# Patient Record
Sex: FEMALE | Race: WHITE | Employment: OTHER | URBAN - METROPOLITAN AREA
[De-identification: names, ages, dates, MRNs, and addresses within clinical notes are randomized per-mention and may not be internally consistent; named-entity substitution may affect disease eponyms.]

---

## 2019-08-02 ENCOUNTER — HOSPITAL ENCOUNTER (INPATIENT)
Age: 84
LOS: 6 days | Discharge: HOME HEALTH | DRG: 178 | End: 2019-08-08
Attending: EMERGENCY MEDICINE | Admitting: INTERNAL MEDICINE
Payer: COMMERCIAL

## 2019-08-02 ENCOUNTER — APPOINTMENT (OUTPATIENT)
Dept: CT IMAGING | Age: 84
DRG: 178 | End: 2019-08-02
Payer: COMMERCIAL

## 2019-08-02 DIAGNOSIS — I48.91 ATRIAL FIBRILLATION WITH RAPID VENTRICULAR RESPONSE (HCC): ICD-10-CM

## 2019-08-02 DIAGNOSIS — Z78.9 FAILURE OF OUTPATIENT TREATMENT: ICD-10-CM

## 2019-08-02 DIAGNOSIS — J44.1 COPD EXACERBATION (HCC): ICD-10-CM

## 2019-08-02 DIAGNOSIS — J18.9 MULTIFOCAL PNEUMONIA: Primary | ICD-10-CM

## 2019-08-02 DIAGNOSIS — Z79.01 ANTICOAGULATED ON COUMADIN: ICD-10-CM

## 2019-08-02 DIAGNOSIS — A41.9 SEPTICEMIA (HCC): ICD-10-CM

## 2019-08-02 LAB
A/G RATIO: 0.8 (ref 1.1–2.2)
ALBUMIN SERPL-MCNC: 3 G/DL (ref 3.4–5)
ALP BLD-CCNC: 137 U/L (ref 40–129)
ALT SERPL-CCNC: 10 U/L (ref 10–40)
ANION GAP SERPL CALCULATED.3IONS-SCNC: 13 MMOL/L (ref 3–16)
AST SERPL-CCNC: 20 U/L (ref 15–37)
BASOPHILS ABSOLUTE: 0.1 K/UL (ref 0–0.2)
BASOPHILS RELATIVE PERCENT: 0.5 %
BILIRUB SERPL-MCNC: 0.7 MG/DL (ref 0–1)
BILIRUBIN URINE: NEGATIVE
BLOOD, URINE: NEGATIVE
BUN BLDV-MCNC: 38 MG/DL (ref 7–20)
CALCIUM SERPL-MCNC: 10 MG/DL (ref 8.3–10.6)
CHLORIDE BLD-SCNC: 104 MMOL/L (ref 99–110)
CLARITY: CLEAR
CO2: 21 MMOL/L (ref 21–32)
COLOR: YELLOW
CREAT SERPL-MCNC: 1.1 MG/DL (ref 0.6–1.2)
EOSINOPHILS ABSOLUTE: 0 K/UL (ref 0–0.6)
EOSINOPHILS RELATIVE PERCENT: 0 %
EPITHELIAL CELLS, UA: 1 /HPF (ref 0–5)
GFR AFRICAN AMERICAN: 57
GFR NON-AFRICAN AMERICAN: 47
GLOBULIN: 3.6 G/DL
GLUCOSE BLD-MCNC: 138 MG/DL (ref 70–99)
GLUCOSE BLD-MCNC: 250 MG/DL (ref 70–99)
GLUCOSE URINE: 250 MG/DL
HCT VFR BLD CALC: 34.2 % (ref 36–48)
HEMOGLOBIN: 10.5 G/DL (ref 12–16)
HYALINE CASTS: 1 /LPF (ref 0–8)
INR BLD: 2.13 (ref 0.86–1.14)
KETONES, URINE: NEGATIVE MG/DL
LACTIC ACID: 1.8 MMOL/L (ref 0.4–2)
LEUKOCYTE ESTERASE, URINE: ABNORMAL
LYMPHOCYTES ABSOLUTE: 0.4 K/UL (ref 1–5.1)
LYMPHOCYTES RELATIVE PERCENT: 2.7 %
MCH RBC QN AUTO: 26.8 PG (ref 26–34)
MCHC RBC AUTO-ENTMCNC: 30.8 G/DL (ref 31–36)
MCV RBC AUTO: 86.9 FL (ref 80–100)
MICROSCOPIC EXAMINATION: YES
MONOCYTES ABSOLUTE: 0.4 K/UL (ref 0–1.3)
MONOCYTES RELATIVE PERCENT: 3.3 %
NEUTROPHILS ABSOLUTE: 12.3 K/UL (ref 1.7–7.7)
NEUTROPHILS RELATIVE PERCENT: 93.5 %
NITRITE, URINE: NEGATIVE
PDW BLD-RTO: 18.5 % (ref 12.4–15.4)
PERFORMED ON: ABNORMAL
PH UA: 5.5 (ref 5–8)
PLATELET # BLD: 173 K/UL (ref 135–450)
PMV BLD AUTO: 9 FL (ref 5–10.5)
POTASSIUM SERPL-SCNC: 4.8 MMOL/L (ref 3.5–5.1)
PRO-BNP: ABNORMAL PG/ML (ref 0–449)
PROCALCITONIN: 2.62 NG/ML (ref 0–0.15)
PROTEIN UA: 30 MG/DL
PROTHROMBIN TIME: 24.3 SEC (ref 9.8–13)
RBC # BLD: 3.93 M/UL (ref 4–5.2)
RBC UA: 3 /HPF (ref 0–4)
SODIUM BLD-SCNC: 138 MMOL/L (ref 136–145)
SPECIFIC GRAVITY UA: 1.02 (ref 1–1.03)
TOTAL PROTEIN: 6.6 G/DL (ref 6.4–8.2)
TROPONIN: <0.01 NG/ML
URINE REFLEX TO CULTURE: YES
URINE TYPE: ABNORMAL
UROBILINOGEN, URINE: 0.2 E.U./DL
WBC # BLD: 13.2 K/UL (ref 4–11)
WBC UA: 2 /HPF (ref 0–5)

## 2019-08-02 PROCEDURE — 87449 NOS EACH ORGANISM AG IA: CPT

## 2019-08-02 PROCEDURE — 6360000002 HC RX W HCPCS: Performed by: PHYSICIAN ASSISTANT

## 2019-08-02 PROCEDURE — 96374 THER/PROPH/DIAG INJ IV PUSH: CPT

## 2019-08-02 PROCEDURE — 83605 ASSAY OF LACTIC ACID: CPT

## 2019-08-02 PROCEDURE — 2580000003 HC RX 258: Performed by: INTERNAL MEDICINE

## 2019-08-02 PROCEDURE — 94640 AIRWAY INHALATION TREATMENT: CPT

## 2019-08-02 PROCEDURE — 36415 COLL VENOUS BLD VENIPUNCTURE: CPT

## 2019-08-02 PROCEDURE — 83880 ASSAY OF NATRIURETIC PEPTIDE: CPT

## 2019-08-02 PROCEDURE — 84145 PROCALCITONIN (PCT): CPT

## 2019-08-02 PROCEDURE — 2580000003 HC RX 258

## 2019-08-02 PROCEDURE — 6360000002 HC RX W HCPCS: Performed by: INTERNAL MEDICINE

## 2019-08-02 PROCEDURE — 87040 BLOOD CULTURE FOR BACTERIA: CPT

## 2019-08-02 PROCEDURE — 2060000000 HC ICU INTERMEDIATE R&B

## 2019-08-02 PROCEDURE — 80053 COMPREHEN METABOLIC PANEL: CPT

## 2019-08-02 PROCEDURE — 87641 MR-STAPH DNA AMP PROBE: CPT

## 2019-08-02 PROCEDURE — 84484 ASSAY OF TROPONIN QUANT: CPT

## 2019-08-02 PROCEDURE — 85025 COMPLETE CBC W/AUTO DIFF WBC: CPT

## 2019-08-02 PROCEDURE — 81001 URINALYSIS AUTO W/SCOPE: CPT

## 2019-08-02 PROCEDURE — 6370000000 HC RX 637 (ALT 250 FOR IP): Performed by: INTERNAL MEDICINE

## 2019-08-02 PROCEDURE — 94760 N-INVAS EAR/PLS OXIMETRY 1: CPT

## 2019-08-02 PROCEDURE — 83036 HEMOGLOBIN GLYCOSYLATED A1C: CPT

## 2019-08-02 PROCEDURE — 93005 ELECTROCARDIOGRAM TRACING: CPT | Performed by: EMERGENCY MEDICINE

## 2019-08-02 PROCEDURE — 71250 CT THORAX DX C-: CPT

## 2019-08-02 PROCEDURE — 85610 PROTHROMBIN TIME: CPT

## 2019-08-02 PROCEDURE — 6370000000 HC RX 637 (ALT 250 FOR IP): Performed by: PHYSICIAN ASSISTANT

## 2019-08-02 PROCEDURE — 6360000002 HC RX W HCPCS: Performed by: EMERGENCY MEDICINE

## 2019-08-02 PROCEDURE — 2580000003 HC RX 258: Performed by: PHYSICIAN ASSISTANT

## 2019-08-02 PROCEDURE — 6360000002 HC RX W HCPCS

## 2019-08-02 PROCEDURE — 87086 URINE CULTURE/COLONY COUNT: CPT

## 2019-08-02 PROCEDURE — 99285 EMERGENCY DEPT VISIT HI MDM: CPT

## 2019-08-02 PROCEDURE — 2700000000 HC OXYGEN THERAPY PER DAY

## 2019-08-02 RX ORDER — BUPROPION HYDROCHLORIDE 100 MG/1
100 TABLET, EXTENDED RELEASE ORAL DAILY
Status: DISCONTINUED | OUTPATIENT
Start: 2019-08-03 | End: 2019-08-08 | Stop reason: HOSPADM

## 2019-08-02 RX ORDER — PRAMIPEXOLE DIHYDROCHLORIDE 0.5 MG/1
0.5 TABLET ORAL 2 TIMES DAILY
COMMUNITY

## 2019-08-02 RX ORDER — OMEPRAZOLE 20 MG/1
20 CAPSULE, DELAYED RELEASE ORAL DAILY
COMMUNITY

## 2019-08-02 RX ORDER — GABAPENTIN 300 MG/1
300 CAPSULE ORAL EVERY EVENING
COMMUNITY

## 2019-08-02 RX ORDER — MONTELUKAST SODIUM 10 MG/1
10 TABLET ORAL NIGHTLY
COMMUNITY

## 2019-08-02 RX ORDER — METHYLPREDNISOLONE SODIUM SUCCINATE 125 MG/2ML
125 INJECTION, POWDER, LYOPHILIZED, FOR SOLUTION INTRAMUSCULAR; INTRAVENOUS ONCE
Status: COMPLETED | OUTPATIENT
Start: 2019-08-02 | End: 2019-08-02

## 2019-08-02 RX ORDER — WARFARIN SODIUM 2 MG/1
2 TABLET ORAL DAILY
Status: DISCONTINUED | OUTPATIENT
Start: 2019-08-02 | End: 2019-08-02 | Stop reason: CLARIF

## 2019-08-02 RX ORDER — 0.9 % SODIUM CHLORIDE 0.9 %
500 INTRAVENOUS SOLUTION INTRAVENOUS ONCE
Status: DISCONTINUED | OUTPATIENT
Start: 2019-08-02 | End: 2019-08-02

## 2019-08-02 RX ORDER — LORAZEPAM 0.5 MG/1
0.5 TABLET ORAL EVERY EVENING
Status: DISCONTINUED | OUTPATIENT
Start: 2019-08-02 | End: 2019-08-08 | Stop reason: HOSPADM

## 2019-08-02 RX ORDER — SPIRONOLACTONE 25 MG/1
25 TABLET ORAL DAILY
COMMUNITY

## 2019-08-02 RX ORDER — SPIRONOLACTONE 25 MG/1
25 TABLET ORAL DAILY
Status: DISCONTINUED | OUTPATIENT
Start: 2019-08-03 | End: 2019-08-08 | Stop reason: HOSPADM

## 2019-08-02 RX ORDER — 0.9 % SODIUM CHLORIDE 0.9 %
30 INTRAVENOUS SOLUTION INTRAVENOUS ONCE
Status: COMPLETED | OUTPATIENT
Start: 2019-08-02 | End: 2019-08-02

## 2019-08-02 RX ORDER — GABAPENTIN 100 MG/1
100 CAPSULE ORAL 3 TIMES DAILY
Status: DISCONTINUED | OUTPATIENT
Start: 2019-08-02 | End: 2019-08-08 | Stop reason: HOSPADM

## 2019-08-02 RX ORDER — IPRATROPIUM BROMIDE AND ALBUTEROL SULFATE 2.5; .5 MG/3ML; MG/3ML
1 SOLUTION RESPIRATORY (INHALATION) ONCE
Status: COMPLETED | OUTPATIENT
Start: 2019-08-02 | End: 2019-08-02

## 2019-08-02 RX ORDER — DEXTROSE MONOHYDRATE 50 MG/ML
100 INJECTION, SOLUTION INTRAVENOUS PRN
Status: DISCONTINUED | OUTPATIENT
Start: 2019-08-02 | End: 2019-08-08 | Stop reason: HOSPADM

## 2019-08-02 RX ORDER — TRAMADOL HYDROCHLORIDE 50 MG/1
50 TABLET ORAL EVERY 6 HOURS PRN
COMMUNITY

## 2019-08-02 RX ORDER — METHYLPREDNISOLONE SODIUM SUCCINATE 40 MG/ML
40 INJECTION, POWDER, LYOPHILIZED, FOR SOLUTION INTRAMUSCULAR; INTRAVENOUS EVERY 8 HOURS
Status: DISCONTINUED | OUTPATIENT
Start: 2019-08-02 | End: 2019-08-05

## 2019-08-02 RX ORDER — CARVEDILOL 3.12 MG/1
3.12 TABLET ORAL 2 TIMES DAILY WITH MEALS
Status: DISCONTINUED | OUTPATIENT
Start: 2019-08-02 | End: 2019-08-08 | Stop reason: HOSPADM

## 2019-08-02 RX ORDER — GABAPENTIN 300 MG/1
300 CAPSULE ORAL EVERY EVENING
Status: DISCONTINUED | OUTPATIENT
Start: 2019-08-02 | End: 2019-08-02 | Stop reason: ALTCHOICE

## 2019-08-02 RX ORDER — CARVEDILOL 3.12 MG/1
3.12 TABLET ORAL 2 TIMES DAILY WITH MEALS
COMMUNITY

## 2019-08-02 RX ORDER — GABAPENTIN 300 MG/1
300 CAPSULE ORAL EVERY EVENING
Status: DISCONTINUED | OUTPATIENT
Start: 2019-08-02 | End: 2019-08-08 | Stop reason: HOSPADM

## 2019-08-02 RX ORDER — ONDANSETRON 2 MG/ML
4 INJECTION INTRAMUSCULAR; INTRAVENOUS EVERY 6 HOURS PRN
Status: DISCONTINUED | OUTPATIENT
Start: 2019-08-02 | End: 2019-08-08 | Stop reason: HOSPADM

## 2019-08-02 RX ORDER — LORAZEPAM 0.5 MG/1
0.5 TABLET ORAL EVERY EVENING
COMMUNITY

## 2019-08-02 RX ORDER — WARFARIN SODIUM 2 MG/1
4 TABLET ORAL EVERY OTHER DAY
Status: DISCONTINUED | OUTPATIENT
Start: 2019-08-02 | End: 2019-08-04 | Stop reason: DRUGHIGH

## 2019-08-02 RX ORDER — SODIUM CHLORIDE 0.9 % (FLUSH) 0.9 %
10 SYRINGE (ML) INJECTION EVERY 12 HOURS SCHEDULED
Status: DISCONTINUED | OUTPATIENT
Start: 2019-08-02 | End: 2019-08-08 | Stop reason: HOSPADM

## 2019-08-02 RX ORDER — BUMETANIDE 1 MG/1
1 TABLET ORAL DAILY
Status: DISCONTINUED | OUTPATIENT
Start: 2019-08-03 | End: 2019-08-08 | Stop reason: HOSPADM

## 2019-08-02 RX ORDER — VANCOMYCIN HYDROCHLORIDE 1 G/200ML
1000 INJECTION, SOLUTION INTRAVENOUS ONCE
Status: DISCONTINUED | OUTPATIENT
Start: 2019-08-02 | End: 2019-08-02 | Stop reason: SDUPTHER

## 2019-08-02 RX ORDER — ASPIRIN 81 MG/1
81 TABLET ORAL DAILY
Status: DISCONTINUED | OUTPATIENT
Start: 2019-08-03 | End: 2019-08-08 | Stop reason: HOSPADM

## 2019-08-02 RX ORDER — GABAPENTIN 100 MG/1
100 CAPSULE ORAL 3 TIMES DAILY
COMMUNITY

## 2019-08-02 RX ORDER — SODIUM CHLORIDE 0.9 % (FLUSH) 0.9 %
10 SYRINGE (ML) INJECTION PRN
Status: DISCONTINUED | OUTPATIENT
Start: 2019-08-02 | End: 2019-08-08 | Stop reason: HOSPADM

## 2019-08-02 RX ORDER — WARFARIN SODIUM 2 MG/1
2 TABLET ORAL
COMMUNITY

## 2019-08-02 RX ORDER — SODIUM CHLORIDE 9 MG/ML
INJECTION, SOLUTION INTRAVENOUS
Status: COMPLETED
Start: 2019-08-02 | End: 2019-08-02

## 2019-08-02 RX ORDER — NICOTINE POLACRILEX 4 MG
15 LOZENGE BUCCAL PRN
Status: DISCONTINUED | OUTPATIENT
Start: 2019-08-02 | End: 2019-08-08 | Stop reason: HOSPADM

## 2019-08-02 RX ORDER — 0.9 % SODIUM CHLORIDE 0.9 %
1000 INTRAVENOUS SOLUTION INTRAVENOUS ONCE
Status: COMPLETED | OUTPATIENT
Start: 2019-08-02 | End: 2019-08-02

## 2019-08-02 RX ORDER — WARFARIN SODIUM 2 MG/1
2 TABLET ORAL EVERY OTHER DAY
Status: DISCONTINUED | OUTPATIENT
Start: 2019-08-03 | End: 2019-08-04 | Stop reason: DRUGHIGH

## 2019-08-02 RX ORDER — LEVOTHYROXINE SODIUM 0.1 MG/1
100 TABLET ORAL DAILY
Status: DISCONTINUED | OUTPATIENT
Start: 2019-08-03 | End: 2019-08-08 | Stop reason: HOSPADM

## 2019-08-02 RX ORDER — DEXTROSE MONOHYDRATE 25 G/50ML
12.5 INJECTION, SOLUTION INTRAVENOUS PRN
Status: DISCONTINUED | OUTPATIENT
Start: 2019-08-02 | End: 2019-08-08 | Stop reason: HOSPADM

## 2019-08-02 RX ORDER — SODIUM CHLORIDE 9 MG/ML
INJECTION, SOLUTION INTRAVENOUS
Status: DISCONTINUED
Start: 2019-08-02 | End: 2019-08-02 | Stop reason: WASHOUT

## 2019-08-02 RX ORDER — BUPROPION HYDROCHLORIDE 100 MG/1
100 TABLET ORAL 2 TIMES DAILY
COMMUNITY

## 2019-08-02 RX ORDER — BUMETANIDE 1 MG/1
1 TABLET ORAL DAILY
COMMUNITY

## 2019-08-02 RX ORDER — ALLOPURINOL 100 MG/1
100 TABLET ORAL DAILY
Status: DISCONTINUED | OUTPATIENT
Start: 2019-08-03 | End: 2019-08-08 | Stop reason: HOSPADM

## 2019-08-02 RX ORDER — INSULIN GLARGINE 100 [IU]/ML
10 INJECTION, SOLUTION SUBCUTANEOUS NIGHTLY
COMMUNITY

## 2019-08-02 RX ORDER — PRAMIPEXOLE DIHYDROCHLORIDE 0.25 MG/1
0.5 TABLET ORAL 2 TIMES DAILY
Status: DISCONTINUED | OUTPATIENT
Start: 2019-08-02 | End: 2019-08-08 | Stop reason: HOSPADM

## 2019-08-02 RX ORDER — LEVOTHYROXINE SODIUM 0.1 MG/1
100 TABLET ORAL DAILY
COMMUNITY

## 2019-08-02 RX ORDER — ALBUTEROL SULFATE 2.5 MG/3ML
5 SOLUTION RESPIRATORY (INHALATION) ONCE
Status: DISCONTINUED | OUTPATIENT
Start: 2019-08-02 | End: 2019-08-02

## 2019-08-02 RX ORDER — GABAPENTIN 100 MG/1
100 CAPSULE ORAL 3 TIMES DAILY
Status: DISCONTINUED | OUTPATIENT
Start: 2019-08-02 | End: 2019-08-02 | Stop reason: CLARIF

## 2019-08-02 RX ORDER — ALLOPURINOL 100 MG/1
100 TABLET ORAL DAILY
COMMUNITY

## 2019-08-02 RX ORDER — ACETAMINOPHEN 325 MG/1
650 TABLET ORAL EVERY 4 HOURS PRN
Status: DISCONTINUED | OUTPATIENT
Start: 2019-08-02 | End: 2019-08-03

## 2019-08-02 RX ORDER — PANTOPRAZOLE SODIUM 40 MG/1
40 TABLET, DELAYED RELEASE ORAL
Status: DISCONTINUED | OUTPATIENT
Start: 2019-08-03 | End: 2019-08-08 | Stop reason: HOSPADM

## 2019-08-02 RX ADMIN — ALBUTEROL SULFATE: 2.5 SOLUTION RESPIRATORY (INHALATION) at 20:06

## 2019-08-02 RX ADMIN — SODIUM CHLORIDE 566 ML: 9 INJECTION, SOLUTION INTRAVENOUS at 17:29

## 2019-08-02 RX ADMIN — METHYLPREDNISOLONE SODIUM SUCCINATE 125 MG: 125 INJECTION, POWDER, FOR SOLUTION INTRAMUSCULAR; INTRAVENOUS at 17:21

## 2019-08-02 RX ADMIN — VANCOMYCIN HYDROCHLORIDE 750 MG: 750 INJECTION, POWDER, LYOPHILIZED, FOR SOLUTION INTRAVENOUS at 23:10

## 2019-08-02 RX ADMIN — PRAMIPEXOLE DIHYDROCHLORIDE 0.5 MG: 0.25 TABLET ORAL at 21:49

## 2019-08-02 RX ADMIN — CARVEDILOL 3.12 MG: 3.12 TABLET, FILM COATED ORAL at 21:49

## 2019-08-02 RX ADMIN — SODIUM CHLORIDE 250 ML: 9 INJECTION, SOLUTION INTRAVENOUS at 22:19

## 2019-08-02 RX ADMIN — LORAZEPAM 0.5 MG: 0.5 TABLET ORAL at 21:48

## 2019-08-02 RX ADMIN — CEFEPIME HYDROCHLORIDE 1 G: 1 INJECTION, POWDER, FOR SOLUTION INTRAMUSCULAR; INTRAVENOUS at 22:18

## 2019-08-02 RX ADMIN — ALBUTEROL SULFATE 5 MG: 2.5 SOLUTION RESPIRATORY (INHALATION) at 16:26

## 2019-08-02 RX ADMIN — SODIUM CHLORIDE 1000 ML: 9 INJECTION, SOLUTION INTRAVENOUS at 17:21

## 2019-08-02 RX ADMIN — INSULIN LISPRO 3 UNITS: 100 INJECTION, SOLUTION INTRAVENOUS; SUBCUTANEOUS at 21:59

## 2019-08-02 RX ADMIN — GABAPENTIN 300 MG: 300 CAPSULE ORAL at 21:49

## 2019-08-02 RX ADMIN — METHYLPREDNISOLONE SODIUM SUCCINATE 40 MG: 40 INJECTION, POWDER, FOR SOLUTION INTRAMUSCULAR; INTRAVENOUS at 21:48

## 2019-08-02 RX ADMIN — Medication 10 ML: at 21:49

## 2019-08-02 RX ADMIN — WARFARIN SODIUM 4 MG: 2 TABLET ORAL at 21:48

## 2019-08-02 RX ADMIN — MEROPENEM 1 G: 1 INJECTION, POWDER, FOR SOLUTION INTRAVENOUS at 18:03

## 2019-08-02 RX ADMIN — IPRATROPIUM BROMIDE AND ALBUTEROL SULFATE 1 AMPULE: .5; 3 SOLUTION RESPIRATORY (INHALATION) at 16:26

## 2019-08-02 SDOH — HEALTH STABILITY: MENTAL HEALTH: HOW OFTEN DO YOU HAVE A DRINK CONTAINING ALCOHOL?: NEVER

## 2019-08-02 ASSESSMENT — ENCOUNTER SYMPTOMS
WHEEZING: 1
SHORTNESS OF BREATH: 1
APNEA: 0
COUGH: 1
VOMITING: 0
NAUSEA: 0
DIARRHEA: 0
ABDOMINAL PAIN: 0
RHINORRHEA: 0
CHEST TIGHTNESS: 1

## 2019-08-02 ASSESSMENT — PAIN SCALES - GENERAL: PAINLEVEL_OUTOF10: 0

## 2019-08-02 NOTE — PROGRESS NOTES
Pt brought to room ia ED stretcher to [phone]. Pt had no belongings with her- does have earrings and glasses on. Pt is visibly tired, RR 22 /77, HR , 98.3 oral, 99% 3L. Pt has no complaints and is alert and oriented- opening eyes to verbal stimulation. Tele box 5390 in place.

## 2019-08-02 NOTE — ED PROVIDER NOTES
activity change, appetite change, chills and fever. HENT: Negative for congestion and rhinorrhea. Respiratory: Positive for cough, chest tightness, shortness of breath and wheezing. Negative for apnea. Cardiovascular: Negative for chest pain. Gastrointestinal: Negative for abdominal pain, diarrhea, nausea and vomiting. Genitourinary: Negative for difficulty urinating, dysuria and hematuria. Positives and Pertinent negatives as per HPI. Except as noted abovein the ROS, all other systems were reviewed and negative. PAST MEDICAL HISTORY   History reviewed. No pertinent past medical history. SURGICAL HISTORY   History reviewed. No pertinent surgical history. CURRENTMEDICATIONS       Previous Medications    ALLOPURINOL (ZYLOPRIM) 100 MG TABLET    Take 100 mg by mouth daily    ASPIRIN 81 MG TABLET    Take 81 mg by mouth daily    BUMETANIDE (BUMEX) 1 MG TABLET    Take 1 mg by mouth daily    BUPROPION (WELLBUTRIN) 100 MG TABLET    Take 100 mg by mouth 2 times daily    CARVEDILOL (COREG) 3.125 MG TABLET    Take 3.125 mg by mouth 2 times daily (with meals)    FERROUS SULFATE (SLOW FE) 142 (45 FE) MG EXTENDED RELEASE TABLET    Take 142 mg by mouth daily    GABAPENTIN (NEURONTIN) 100 MG CAPSULE    Take 100 mg by mouth 3 times daily. GABAPENTIN (NEURONTIN) 300 MG CAPSULE    Take 300 mg by mouth every evening. LEVOTHYROXINE (SYNTHROID) 100 MCG TABLET    Take 100 mcg by mouth Daily    LORAZEPAM (ATIVAN) 0.5 MG TABLET    Take 0.5 mg by mouth every evening. MONTELUKAST (SINGULAIR) 10 MG TABLET    Take 10 mg by mouth nightly    OMEPRAZOLE (PRILOSEC) 20 MG DELAYED RELEASE CAPSULE    Take 20 mg by mouth daily    PRAMIPEXOLE (MIRAPEX) 0.5 MG TABLET    Take 0.5 mg by mouth 2 times daily     SPIRONOLACTONE (ALDACTONE) 25 MG TABLET    Take 25 mg by mouth daily    TRAMADOL (ULTRAM) 50 MG TABLET    Take 50 mg by mouth every 6 hours as needed for Pain.     WARFARIN (COUMADIN) 2 MG TABLET    Take 2 PROTIME-INR - Abnormal; Notable for the following components:    Protime 24.3 (*)     INR 2.13 (*)     All other components within normal limits    Narrative:     Performed at:  [facility] [address],  [facility], [address] [facility]   Phone [phone] PEPTIDE - Abnormal; Notable for the following components:    Pro-BNP 15,358 (*)     All other components within normal limits    Narrative:     Performed at:  [facility] [address],  [facility], [address] [facility]   Phone [phone]   CULTURE BLOOD #2   CULTURE BLOOD #1   TROPONIN    Narrative:     Performed at:  [facility] [address],  [facility], [address] [facility]   Phone [phone]   LACTIC ACID, PLASMA    Narrative:     Performed at:  [facility] [address],  [facility], [address] [facility]   Phone [phone]   URINE RT REFLEX TO CULTURE   PROCALCITONIN       All other labs were within normal range or not returned as of this dictation. EKG: All EKG's are interpreted by the Emergency Department Physician in the absence of a cardiologist.  Please see their note for interpretation of EKG. RADIOLOGY:   Non-plain film images such as CT, Ultrasound and MRI are read by the radiologist. Jarome Crosser radiographic images are visualized andpreliminarily interpreted by the  ED Provider with the below findings:        Interpretation perthe Radiologist below, if available at the time of this note:    CT CHEST [address]   Final Result   Extensive multifocal pneumonia manifested by dense consolidation as well as   discrete and confluent scattered airspace nodularity is an underlying   ground-glass attenuation maximal in the lower lobes and relatively sparing   left upper lobe and lung apices. There is reactive mediastinal   lymphadenopathy.            Ct Chest Wo Contrast    Result Date: 8/2/2019  EXAMINATION: CT OF THE CHEST WITHOUT CONTRAST 8/2/2019 4:38 pm TECHNIQUE: CT of the chest was performed without the administration of intravenous contrast. Multiplanar reformatted images are provided for review. Dose modulation, iterative reconstruction, and/or weight based adjustment of the mA/kV was utilized to reduce the radiation dose to as low as reasonably achievable. COMPARISON: None. HISTORY: ORDERING SYSTEM PROVIDED HISTORY: r/o pneumonia TECHNOLOGIST PROVIDED HISTORY: Reason for Exam: r/o pneumonia Acuity: Unknown Type of Exam: Unknown FINDINGS: Mediastinum: Cardiomegaly. Aortic and coronary artery calcifications noted diffusely. Enlarged pulmonary trunk is noted which can be seen in pulmonary hypertension. There is scattered mediastinal adenopathy. Largest is a precarinal 2.1 x 2.5 cm lymph node. Other nodes are noted in the AP window and there is a subcarinal nodes at least 2 cm in AP diameter. Absence of IV contrast limits separation of the nodes from adjacent structures. Thyroid gland and esophagus grossly. Lungs/pleura: Extensive patchy right lower lobe consolidation with air bronchograms maximal in the posterior basal region. The rest of the right lower lobe as well as the basal segments of the left lower lobe, the inferior lingular and portion of the lateral segment of the middle lobe demonstrate patchy discrete and confluent parenchymal airspace irregular nodular densities with underlying ground-glass attenuation. Combination of findings consistent with multifocal pneumonia. An isolated 2.6 cm airspace parenchymal density in the lingular on series 3, image 52 also felt to be in the spectrum of pneumonia. Multifocal airspace nodularities in the posterior segment right lower lobe also consistent with pneumonia. Trace right pleural effusion. No pneumothorax. Upper Abdomen: No adrenal mass. No suspicious lesions. Severe atherosclerotic calcifications of the splenic artery. Soft Tissues/Bones: Median sternotomy.   No due to a pneumonia. They state that she was sent home on Monday. They state that the patient was doing well on Monday, Tuesday and Wednesday. However starting yesterday she had increasing shortness of breath, and she continues to have a cough which is productive of sputum. No hemoptysis. Did not seem to have worsening shortness of breath, which prompted her visit to the ED. Patient is reporting a chest tightness, she typically gets this with her COPD but she denies any true chest pain. She denies any fever chills. She denies any abdominal pain. No nausea, vomiting or diarrhea. No urinary symptoms. She has no other complaints at this time. Physical exam she is in atrial fibrillation, she is tachycardic, she has diminished aeration and wheezing throughout all lung fields. She is tachypneic    CBC shows a leukocytosis of 13.2, mild anemia. Her CMP is relatively unremarkable. INR is 2.13. Lactic acid is normal.  CT of chest shows extensive multifocal pneumonia with dense consolidation and scattered airspace disease. She was given fluids, vancomycin and meropenem in the ED due to allergies. She is also given multiple breathing treatments and Solu-Medrol. I believe that she will need to be admitted for further care and evaluation, the hospitalist is agreed for admission. Patient and family are updated and agreeable to plan. Stable for admission    SEP-1 CORE MEASURE DATA    Classification: sepsis    Amount of fluids ordered: at least 30mL/kg based on entered actual weight at time of triage    Time at which sepsis was identified: 5:56 PM    Broad-spectrum antibiotics chosen: Vancomycin and Meropenem based on sepsis order-set for a suspected source of: Pulmonary - Nosocomial    Repeat lactate level: not indicated    On reassessment after fluid resuscitation:   pending, to be completed by inpatient team      FINAL IMPRESSION      1. Multifocal pneumonia    2. Failure of outpatient treatment    3. COPD exacerbation ([facility] [address]    4. Septicemia ([facility] [address]    5. Atrial fibrillation with rapid ventricular response ([facility] [address]    6. Anticoagulated on Coumadin          DISPOSITION/PLAN   DISPOSITION Decision To Admit 08/02/2019 05:06:23 PM      PATIENT REFERREDTO:  No follow-up provider specified.     DISCHARGE MEDICATIONS:  New Prescriptions    No medications on file       DISCONTINUED MEDICATIONS:  Discontinued Medications    FERROUS SULFATE (SLOW FE PO)    Take by mouth    IRON DEXTRAN COMPLEX (INFED) 50 MG/ML INJECTION    Inject 100 mg into the muscle once              (Please note that portions ofthis note were completed with a voice recognition program.  Efforts were made to edit the dictations but occasionally words are mis-transcribed.)    Rupa Demarco PA-C (electronically signed)            Rupa Demarco PA-C  08/02/19 3569

## 2019-08-02 NOTE — H&P
Pastor Palma    Radiology:     CXR: I have reviewed the CXR with the following interpretation: Multifocal pneumonia  EKG:  I have reviewed the EKG with the following interpretation: Atrial fibrillation. No signs of acute ischemia    CT CHEST WO CONTRAST   Final Result   Extensive multifocal pneumonia manifested by dense consolidation as well as   discrete and confluent scattered airspace nodularity is an underlying   ground-glass attenuation maximal in the lower lobes and relatively sparing   left upper lobe and lung apices. There is reactive mediastinal   lymphadenopathy. ASSESSMENT:    Active Hospital Problems    Diagnosis Date Noted    Multifocal pneumonia [J18.9] 08/02/2019         PLAN:      Multifocal pneumonia  Recently treated at [facility].  Received 1 dose of IV levofloxacin, later switched to oral doxycycline. At this point, looking at the failure of doxycycline, I believe this is a gram-negative pneumonia. Will be treated with cefepime and vancomycin combination. I am also checking urine Streptococcus and Legionella antigens as well as MRSA nasal swab. Pulmonology consulted. Patient has gotten worse in a short period of time. Already has baseline lung disease. Will need to be followed closely. COPD exacerbation  Started on IV steroids. I am also adding hand-held nebulizers. Pulmonology consulted. Chronic hypoxemic respiratory failure, oxygen dependent at home  Continue oxygen supplementation. Did not need ICU or invasive/noninvasive ventilatory support at the time of the initial visit. Chronic atrial fibrillation  Remains on atrial fibrillation. Heart rate slightly above baseline. No need for intravenous treatment for atrial fibrillation. I expect the heart rate to go down with appropriate treatment of acute infection  Patient is on warfarin, which will be continued. Pharmacy monitors the warfarin dosing.     Chronic kidney disease stage

## 2019-08-03 LAB
A/G RATIO: 0.9 (ref 1.1–2.2)
ALBUMIN SERPL-MCNC: 2.7 G/DL (ref 3.4–5)
ALP BLD-CCNC: 121 U/L (ref 40–129)
ALT SERPL-CCNC: 8 U/L (ref 10–40)
ANION GAP SERPL CALCULATED.3IONS-SCNC: 14 MMOL/L (ref 3–16)
AST SERPL-CCNC: 8 U/L (ref 15–37)
BASOPHILS ABSOLUTE: 0 K/UL (ref 0–0.2)
BASOPHILS RELATIVE PERCENT: 0.2 %
BILIRUB SERPL-MCNC: 0.6 MG/DL (ref 0–1)
BUN BLDV-MCNC: 41 MG/DL (ref 7–20)
CALCIUM SERPL-MCNC: 9.8 MG/DL (ref 8.3–10.6)
CHLORIDE BLD-SCNC: 102 MMOL/L (ref 99–110)
CO2: 20 MMOL/L (ref 21–32)
CREAT SERPL-MCNC: 1.2 MG/DL (ref 0.6–1.2)
EKG ATRIAL RATE: 104 BPM
EKG DIAGNOSIS: NORMAL
EKG Q-T INTERVAL: 286 MS
EKG QRS DURATION: 94 MS
EKG QTC CALCULATION (BAZETT): 394 MS
EKG R AXIS: 29 DEGREES
EKG T AXIS: 135 DEGREES
EKG VENTRICULAR RATE: 114 BPM
EOSINOPHILS ABSOLUTE: 0 K/UL (ref 0–0.6)
EOSINOPHILS RELATIVE PERCENT: 0 %
ESTIMATED AVERAGE GLUCOSE: 177.2 MG/DL
GFR AFRICAN AMERICAN: 51
GFR NON-AFRICAN AMERICAN: 42
GLOBULIN: 3.1 G/DL
GLUCOSE BLD-MCNC: 250 MG/DL (ref 70–99)
GLUCOSE BLD-MCNC: 285 MG/DL (ref 70–99)
GLUCOSE BLD-MCNC: 340 MG/DL (ref 70–99)
GLUCOSE BLD-MCNC: 346 MG/DL (ref 70–99)
GLUCOSE BLD-MCNC: 360 MG/DL (ref 70–99)
HBA1C MFR BLD: 7.8 %
HCT VFR BLD CALC: 29.6 % (ref 36–48)
HEMOGLOBIN: 9.4 G/DL (ref 12–16)
INR BLD: 2.03 (ref 0.86–1.14)
L. PNEUMOPHILA SEROGP 1 UR AG: NORMAL
LYMPHOCYTES ABSOLUTE: 0.3 K/UL (ref 1–5.1)
LYMPHOCYTES RELATIVE PERCENT: 7 %
MCH RBC QN AUTO: 27 PG (ref 26–34)
MCHC RBC AUTO-ENTMCNC: 31.7 G/DL (ref 31–36)
MCV RBC AUTO: 85.2 FL (ref 80–100)
MONOCYTES ABSOLUTE: 0.1 K/UL (ref 0–1.3)
MONOCYTES RELATIVE PERCENT: 2.3 %
NEUTROPHILS ABSOLUTE: 3.6 K/UL (ref 1.7–7.7)
NEUTROPHILS RELATIVE PERCENT: 90.5 %
PDW BLD-RTO: 18.3 % (ref 12.4–15.4)
PERFORMED ON: ABNORMAL
PLATELET # BLD: 131 K/UL (ref 135–450)
PMV BLD AUTO: 9.3 FL (ref 5–10.5)
POTASSIUM REFLEX MAGNESIUM: 4.6 MMOL/L (ref 3.5–5.1)
PROTHROMBIN TIME: 23.1 SEC (ref 9.8–13)
RBC # BLD: 3.47 M/UL (ref 4–5.2)
SODIUM BLD-SCNC: 136 MMOL/L (ref 136–145)
STREP PNEUMONIAE ANTIGEN, URINE: NORMAL
TOTAL PROTEIN: 5.8 G/DL (ref 6.4–8.2)
WBC # BLD: 4 K/UL (ref 4–11)

## 2019-08-03 PROCEDURE — 2580000003 HC RX 258: Performed by: INTERNAL MEDICINE

## 2019-08-03 PROCEDURE — 92526 ORAL FUNCTION THERAPY: CPT

## 2019-08-03 PROCEDURE — 94760 N-INVAS EAR/PLS OXIMETRY 1: CPT

## 2019-08-03 PROCEDURE — 6360000002 HC RX W HCPCS: Performed by: INTERNAL MEDICINE

## 2019-08-03 PROCEDURE — 6370000000 HC RX 637 (ALT 250 FOR IP): Performed by: PHYSICIAN ASSISTANT

## 2019-08-03 PROCEDURE — 92610 EVALUATE SWALLOWING FUNCTION: CPT

## 2019-08-03 PROCEDURE — 93010 ELECTROCARDIOGRAM REPORT: CPT | Performed by: INTERNAL MEDICINE

## 2019-08-03 PROCEDURE — 6370000000 HC RX 637 (ALT 250 FOR IP): Performed by: INTERNAL MEDICINE

## 2019-08-03 PROCEDURE — 2700000000 HC OXYGEN THERAPY PER DAY

## 2019-08-03 PROCEDURE — 87641 MR-STAPH DNA AMP PROBE: CPT

## 2019-08-03 PROCEDURE — 87205 SMEAR GRAM STAIN: CPT

## 2019-08-03 PROCEDURE — 94640 AIRWAY INHALATION TREATMENT: CPT

## 2019-08-03 PROCEDURE — 2060000000 HC ICU INTERMEDIATE R&B

## 2019-08-03 PROCEDURE — 87077 CULTURE AEROBIC IDENTIFY: CPT

## 2019-08-03 PROCEDURE — 36415 COLL VENOUS BLD VENIPUNCTURE: CPT

## 2019-08-03 PROCEDURE — 87186 SC STD MICRODIL/AGAR DIL: CPT

## 2019-08-03 PROCEDURE — 99223 1ST HOSP IP/OBS HIGH 75: CPT | Performed by: INTERNAL MEDICINE

## 2019-08-03 PROCEDURE — 85610 PROTHROMBIN TIME: CPT

## 2019-08-03 PROCEDURE — 94669 MECHANICAL CHEST WALL OSCILL: CPT

## 2019-08-03 PROCEDURE — 87070 CULTURE OTHR SPECIMN AEROBIC: CPT

## 2019-08-03 PROCEDURE — 80053 COMPREHEN METABOLIC PANEL: CPT

## 2019-08-03 PROCEDURE — 85025 COMPLETE CBC W/AUTO DIFF WBC: CPT

## 2019-08-03 PROCEDURE — 94761 N-INVAS EAR/PLS OXIMETRY MLT: CPT

## 2019-08-03 RX ORDER — IPRATROPIUM BROMIDE AND ALBUTEROL SULFATE 2.5; .5 MG/3ML; MG/3ML
1 SOLUTION RESPIRATORY (INHALATION) ONCE
Status: COMPLETED | OUTPATIENT
Start: 2019-08-03 | End: 2019-08-03

## 2019-08-03 RX ORDER — ALBUTEROL SULFATE 2.5 MG/3ML
2.5 SOLUTION RESPIRATORY (INHALATION) EVERY 4 HOURS PRN
Status: DISCONTINUED | OUTPATIENT
Start: 2019-08-03 | End: 2019-08-08 | Stop reason: HOSPADM

## 2019-08-03 RX ORDER — KETOROLAC TROMETHAMINE 30 MG/ML
15 INJECTION, SOLUTION INTRAMUSCULAR; INTRAVENOUS EVERY 6 HOURS PRN
Status: DISCONTINUED | OUTPATIENT
Start: 2019-08-03 | End: 2019-08-08 | Stop reason: HOSPADM

## 2019-08-03 RX ORDER — BENZONATATE 100 MG/1
100 CAPSULE ORAL 3 TIMES DAILY PRN
Status: DISCONTINUED | OUTPATIENT
Start: 2019-08-03 | End: 2019-08-08 | Stop reason: HOSPADM

## 2019-08-03 RX ORDER — ACETAMINOPHEN 500 MG
1000 TABLET ORAL EVERY 6 HOURS PRN
Status: DISCONTINUED | OUTPATIENT
Start: 2019-08-03 | End: 2019-08-08 | Stop reason: HOSPADM

## 2019-08-03 RX ORDER — IPRATROPIUM BROMIDE AND ALBUTEROL SULFATE 2.5; .5 MG/3ML; MG/3ML
1 SOLUTION RESPIRATORY (INHALATION)
Status: DISCONTINUED | OUTPATIENT
Start: 2019-08-03 | End: 2019-08-08 | Stop reason: HOSPADM

## 2019-08-03 RX ADMIN — METHYLPREDNISOLONE SODIUM SUCCINATE 40 MG: 40 INJECTION, POWDER, FOR SOLUTION INTRAMUSCULAR; INTRAVENOUS at 05:19

## 2019-08-03 RX ADMIN — INSULIN LISPRO 8 UNITS: 100 INJECTION, SOLUTION INTRAVENOUS; SUBCUTANEOUS at 18:18

## 2019-08-03 RX ADMIN — INSULIN LISPRO 10 UNITS: 100 INJECTION, SOLUTION INTRAVENOUS; SUBCUTANEOUS at 12:21

## 2019-08-03 RX ADMIN — GABAPENTIN 100 MG: 100 CAPSULE ORAL at 09:03

## 2019-08-03 RX ADMIN — WARFARIN SODIUM 2 MG: 2 TABLET ORAL at 18:21

## 2019-08-03 RX ADMIN — CEFEPIME HYDROCHLORIDE 1 G: 1 INJECTION, POWDER, FOR SOLUTION INTRAMUSCULAR; INTRAVENOUS at 09:04

## 2019-08-03 RX ADMIN — METHYLPREDNISOLONE SODIUM SUCCINATE 40 MG: 40 INJECTION, POWDER, FOR SOLUTION INTRAMUSCULAR; INTRAVENOUS at 13:21

## 2019-08-03 RX ADMIN — INSULIN GLARGINE 10 UNITS: 100 INJECTION, SOLUTION SUBCUTANEOUS at 22:07

## 2019-08-03 RX ADMIN — LEVOTHYROXINE SODIUM 100 MCG: 100 TABLET ORAL at 05:19

## 2019-08-03 RX ADMIN — Medication 10 ML: at 22:14

## 2019-08-03 RX ADMIN — PRAMIPEXOLE DIHYDROCHLORIDE 0.5 MG: 0.25 TABLET ORAL at 22:14

## 2019-08-03 RX ADMIN — IPRATROPIUM BROMIDE AND ALBUTEROL SULFATE 1 AMPULE: .5; 3 SOLUTION RESPIRATORY (INHALATION) at 15:41

## 2019-08-03 RX ADMIN — GABAPENTIN 100 MG: 100 CAPSULE ORAL at 14:44

## 2019-08-03 RX ADMIN — INSULIN LISPRO 6 UNITS: 100 INJECTION, SOLUTION INTRAVENOUS; SUBCUTANEOUS at 09:01

## 2019-08-03 RX ADMIN — ALLOPURINOL 100 MG: 100 TABLET ORAL at 09:02

## 2019-08-03 RX ADMIN — GABAPENTIN 100 MG: 100 CAPSULE ORAL at 18:21

## 2019-08-03 RX ADMIN — CARVEDILOL 3.12 MG: 3.12 TABLET, FILM COATED ORAL at 09:03

## 2019-08-03 RX ADMIN — Medication 10 ML: at 13:21

## 2019-08-03 RX ADMIN — Medication 10 ML: at 09:04

## 2019-08-03 RX ADMIN — PANTOPRAZOLE SODIUM 40 MG: 40 TABLET, DELAYED RELEASE ORAL at 05:19

## 2019-08-03 RX ADMIN — VANCOMYCIN HYDROCHLORIDE 750 MG: 750 INJECTION, POWDER, LYOPHILIZED, FOR SOLUTION INTRAVENOUS at 22:20

## 2019-08-03 RX ADMIN — BENZONATATE 100 MG: 100 CAPSULE ORAL at 16:33

## 2019-08-03 RX ADMIN — LORAZEPAM 0.5 MG: 0.5 TABLET ORAL at 22:14

## 2019-08-03 RX ADMIN — INSULIN LISPRO 4 UNITS: 100 INJECTION, SOLUTION INTRAVENOUS; SUBCUTANEOUS at 22:07

## 2019-08-03 RX ADMIN — CEFEPIME HYDROCHLORIDE 1 G: 1 INJECTION, POWDER, FOR SOLUTION INTRAMUSCULAR; INTRAVENOUS at 20:36

## 2019-08-03 RX ADMIN — SPIRONOLACTONE 25 MG: 25 TABLET ORAL at 09:03

## 2019-08-03 RX ADMIN — IPRATROPIUM BROMIDE AND ALBUTEROL SULFATE 1 AMPULE: .5; 3 SOLUTION RESPIRATORY (INHALATION) at 12:04

## 2019-08-03 RX ADMIN — METHYLPREDNISOLONE SODIUM SUCCINATE 40 MG: 40 INJECTION, POWDER, FOR SOLUTION INTRAMUSCULAR; INTRAVENOUS at 18:22

## 2019-08-03 RX ADMIN — GABAPENTIN 300 MG: 300 CAPSULE ORAL at 22:14

## 2019-08-03 RX ADMIN — BUMETANIDE 1 MG: 1 TABLET ORAL at 12:20

## 2019-08-03 RX ADMIN — PRAMIPEXOLE DIHYDROCHLORIDE 0.5 MG: 0.25 TABLET ORAL at 09:03

## 2019-08-03 RX ADMIN — IPRATROPIUM BROMIDE AND ALBUTEROL SULFATE 1 AMPULE: .5; 3 SOLUTION RESPIRATORY (INHALATION) at 20:49

## 2019-08-03 RX ADMIN — ASPIRIN 81 MG: 81 TABLET ORAL at 09:03

## 2019-08-03 RX ADMIN — Medication 10 ML: at 18:20

## 2019-08-03 RX ADMIN — BUPROPION HYDROCHLORIDE 100 MG: 100 TABLET, EXTENDED RELEASE ORAL at 09:03

## 2019-08-03 RX ADMIN — CARVEDILOL 3.12 MG: 3.12 TABLET, FILM COATED ORAL at 18:20

## 2019-08-03 ASSESSMENT — PAIN SCALES - GENERAL
PAINLEVEL_OUTOF10: 0

## 2019-08-03 NOTE — PROGRESS NOTES
appears stated age and cooperative. HEENT: Pupils equal, round, and reactive to light. Conjunctivae/corneas clear. Neck: Supple, with full range of motion. No jugular venous distention. Trachea midline. Respiratory:  Normal respiratory effort. Lateral wheezes and crackles  Cardiovascular: Regular rate and rhythm with normal S1/S2 without murmurs, rubs or gallops. Abdomen: Soft, non-tender, non-distended with normal bowel sounds. Musculoskeletal: Has upper extremity clubbing, no cyanosis or edema bilaterally. Full range of motion without deformity. Skin: Skin color, texture, turgor normal.  No rashes or lesions. Neurologic:  Neurovascularly intact without any focal sensory/motor deficits.  Cranial nerves: II-XII intact, grossly non-focal.  Psychiatric: Alert and oriented, thought content appropriate, normal insight  Capillary Refill: Brisk,< 3 seconds   Peripheral Pulses: +2 palpable, equal bilaterally       Labs:   Recent Labs     08/02/19 1618 08/03/19  0548   WBC 13.2* 4.0   HGB 10.5* 9.4*   HCT 34.2* 29.6*    131*     Recent Labs     08/02/19  1618 08/03/19  0548    136   K 4.8 4.6    102   CO2 21 20*   BUN 38* 41*   CREATININE 1.1 1.2   CALCIUM 10.0 9.8     Recent Labs     08/02/19  1618 08/03/19  0548   AST 20 8*   ALT 10 8*   BILITOT 0.7 0.6   ALKPHOS 137* 121     Recent Labs     08/02/19  1618 08/03/19  0548   INR 2.13* 2.03*     Recent Labs     08/02/19 1618   TROPONINI <0.01       Urinalysis:      Lab Results   Component Value Date    NITRU Negative 08/02/2019    WBCUA 2 08/02/2019    RBCUA 3 08/02/2019    BLOODU Negative 08/02/2019    SPECGRAV 1.018 08/02/2019    GLUCOSEU 250 08/02/2019       Radiology:  CT CHEST WO CONTRAST   Final Result   Extensive multifocal pneumonia manifested by dense consolidation as well as   discrete and confluent scattered airspace nodularity is an underlying   ground-glass attenuation maximal in the lower lobes and relatively sparing   left upper

## 2019-08-03 NOTE — PROGRESS NOTES
CLINICAL BEDSIDE SWALLOWING EVALUATION  Speech Therapy Department    Patient Name:  Jesika Cancino  :  1930  Pain level:denies   Medical Diagnosis:   Multifocal pneumonia [J18.9]  Multifocal pneumonia [J18.9]    HPI: \"89 y.o. female who presented to [facility] with aggressive cough and shortness of breath for past 24 hours. Patient was admitted at [facility] last week. At that time, she was admitted with septic shock. Treated with IV fluids and pressors. Quickly switched to orals and discharged home on oral doxycycline. Did well in first couple days, but today she started having progressively worsening shortness of breath and cough. Came to the emergency room because of inability to function. Appears in acute distress in the emergency room. However, she has been doing fine with nasal cannula oxygen only. Evaluation showed patient had multifocal pneumonia. Patient is being admitted. Hospitalist group called for admission  at bedside. \"  CT chest:  Impression   Extensive multifocal pneumonia manifested by dense consolidation as well as   discrete and confluent scattered airspace nodularity is an underlying   ground-glass attenuation maximal in the lower lobes and relatively sparing   left upper lobe and lung apices.  There is reactive mediastinal   lymphadenopathy. SLP eval and treat orders received due to concern for aspiration. Per chart, pt with Modified Barium Swallow completed at outside hospital in 2018. Prior MBS revealed mild oropharyngeal dysphagia with no aspiration/penetration and recommendations for Regular texture diet with thin liquids with use of strict aspiration precautions. Pt reported she avoids dry and stringy foods due feeling as if they get stuck. She also states she has to eat/drink slowly. Treatment Diagnosis: Moderate Oropharyngeal Dysphagia    Impressions: Pt was positioned upright in bed on 2L O2 via nasal cannula.  Congested cough was noted

## 2019-08-03 NOTE — PROGRESS NOTES
4 Eyes Skin Assessment     The patient is being assess for  Admission    I agree that 2 RN's have performed a thorough Head to Toe Skin Assessment on the patient. ALL assessment sites listed below have been assessed. Areas assessed by both nurses: Yes  [x]   Head, Face, and Ears   [x]   Shoulders, Back, and Chest  [x]   Arms, Elbows, and Hands   [x]   Coccyx, Sacrum, and IschIum  [x]   Legs, Feet, and Heels        Does the Patient have Skin Breakdown?   Yes, healing stage II wound, mepilex in place         Denys Prevention initiated:  Yes   Wound Care Orders initiated:  No      WOC nurse consulted for Pressure Injury (Stage 3,4, Unstageable, DTI, NWPT, and Complex wounds), New and Established Ostomies:  No      Nurse 1 eSignature: Electronically signed by Gaines Favre, RN on 8/3/19 at 5:15 AM    **SHARE this note so that the co-signing nurse is able to place an eSignature**    Nurse 2 eSignature: Electronically signed by Mariah Shoemaker RN on 8/3/19 at 8:11 PM

## 2019-08-04 LAB
GLUCOSE BLD-MCNC: 269 MG/DL (ref 70–99)
GLUCOSE BLD-MCNC: 327 MG/DL (ref 70–99)
GLUCOSE BLD-MCNC: 335 MG/DL (ref 70–99)
GLUCOSE BLD-MCNC: 362 MG/DL (ref 70–99)
INR BLD: 2.94 (ref 0.86–1.14)
MRSA SCREEN RT-PCR: NORMAL
PERFORMED ON: ABNORMAL
PROTHROMBIN TIME: 33.5 SEC (ref 9.8–13)
URINE CULTURE, ROUTINE: NORMAL

## 2019-08-04 PROCEDURE — 94669 MECHANICAL CHEST WALL OSCILL: CPT

## 2019-08-04 PROCEDURE — 6360000002 HC RX W HCPCS: Performed by: PHYSICIAN ASSISTANT

## 2019-08-04 PROCEDURE — 97161 PT EVAL LOW COMPLEX 20 MIN: CPT

## 2019-08-04 PROCEDURE — 2700000000 HC OXYGEN THERAPY PER DAY

## 2019-08-04 PROCEDURE — 97530 THERAPEUTIC ACTIVITIES: CPT

## 2019-08-04 PROCEDURE — 94640 AIRWAY INHALATION TREATMENT: CPT

## 2019-08-04 PROCEDURE — 2060000000 HC ICU INTERMEDIATE R&B

## 2019-08-04 PROCEDURE — 97165 OT EVAL LOW COMPLEX 30 MIN: CPT

## 2019-08-04 PROCEDURE — 85610 PROTHROMBIN TIME: CPT

## 2019-08-04 PROCEDURE — 97535 SELF CARE MNGMENT TRAINING: CPT

## 2019-08-04 PROCEDURE — 6370000000 HC RX 637 (ALT 250 FOR IP): Performed by: INTERNAL MEDICINE

## 2019-08-04 PROCEDURE — 6370000000 HC RX 637 (ALT 250 FOR IP): Performed by: PHYSICIAN ASSISTANT

## 2019-08-04 PROCEDURE — 6360000002 HC RX W HCPCS: Performed by: INTERNAL MEDICINE

## 2019-08-04 PROCEDURE — 92526 ORAL FUNCTION THERAPY: CPT

## 2019-08-04 PROCEDURE — 2580000003 HC RX 258

## 2019-08-04 PROCEDURE — 99233 SBSQ HOSP IP/OBS HIGH 50: CPT | Performed by: INTERNAL MEDICINE

## 2019-08-04 PROCEDURE — 36415 COLL VENOUS BLD VENIPUNCTURE: CPT

## 2019-08-04 PROCEDURE — 94761 N-INVAS EAR/PLS OXIMETRY MLT: CPT

## 2019-08-04 PROCEDURE — 2580000003 HC RX 258: Performed by: INTERNAL MEDICINE

## 2019-08-04 RX ORDER — SODIUM CHLORIDE 9 MG/ML
INJECTION, SOLUTION INTRAVENOUS
Status: COMPLETED
Start: 2019-08-04 | End: 2019-08-04

## 2019-08-04 RX ADMIN — INSULIN GLARGINE 10 UNITS: 100 INJECTION, SOLUTION SUBCUTANEOUS at 20:21

## 2019-08-04 RX ADMIN — PRAMIPEXOLE DIHYDROCHLORIDE 0.5 MG: 0.25 TABLET ORAL at 20:20

## 2019-08-04 RX ADMIN — SODIUM CHLORIDE 50 ML: 9 INJECTION, SOLUTION INTRAVENOUS at 08:11

## 2019-08-04 RX ADMIN — ALLOPURINOL 100 MG: 100 TABLET ORAL at 08:02

## 2019-08-04 RX ADMIN — ASPIRIN 81 MG: 81 TABLET ORAL at 08:02

## 2019-08-04 RX ADMIN — LEVOTHYROXINE SODIUM 100 MCG: 100 TABLET ORAL at 05:22

## 2019-08-04 RX ADMIN — IPRATROPIUM BROMIDE AND ALBUTEROL SULFATE 1 AMPULE: .5; 3 SOLUTION RESPIRATORY (INHALATION) at 11:43

## 2019-08-04 RX ADMIN — INSULIN LISPRO 8 UNITS: 100 INJECTION, SOLUTION INTRAVENOUS; SUBCUTANEOUS at 09:01

## 2019-08-04 RX ADMIN — CEFEPIME HYDROCHLORIDE 1 G: 1 INJECTION, POWDER, FOR SOLUTION INTRAMUSCULAR; INTRAVENOUS at 20:21

## 2019-08-04 RX ADMIN — INSULIN LISPRO 6 UNITS: 100 INJECTION, SOLUTION INTRAVENOUS; SUBCUTANEOUS at 17:10

## 2019-08-04 RX ADMIN — INSULIN LISPRO 10 UNITS: 100 INJECTION, SOLUTION INTRAVENOUS; SUBCUTANEOUS at 11:51

## 2019-08-04 RX ADMIN — ACETAMINOPHEN 1000 MG: 500 TABLET, FILM COATED ORAL at 00:15

## 2019-08-04 RX ADMIN — Medication 10 ML: at 11:51

## 2019-08-04 RX ADMIN — GABAPENTIN 100 MG: 100 CAPSULE ORAL at 08:02

## 2019-08-04 RX ADMIN — CARVEDILOL 3.12 MG: 3.12 TABLET, FILM COATED ORAL at 17:09

## 2019-08-04 RX ADMIN — SPIRONOLACTONE 25 MG: 25 TABLET ORAL at 08:02

## 2019-08-04 RX ADMIN — GABAPENTIN 100 MG: 100 CAPSULE ORAL at 17:09

## 2019-08-04 RX ADMIN — KETOROLAC TROMETHAMINE 15 MG: 30 INJECTION, SOLUTION INTRAMUSCULAR at 08:10

## 2019-08-04 RX ADMIN — BENZONATATE 100 MG: 100 CAPSULE ORAL at 08:10

## 2019-08-04 RX ADMIN — METHYLPREDNISOLONE SODIUM SUCCINATE 40 MG: 40 INJECTION, POWDER, FOR SOLUTION INTRAMUSCULAR; INTRAVENOUS at 11:50

## 2019-08-04 RX ADMIN — BUMETANIDE 1 MG: 1 TABLET ORAL at 09:18

## 2019-08-04 RX ADMIN — IPRATROPIUM BROMIDE AND ALBUTEROL SULFATE 1 AMPULE: .5; 3 SOLUTION RESPIRATORY (INHALATION) at 07:34

## 2019-08-04 RX ADMIN — PANTOPRAZOLE SODIUM 40 MG: 40 TABLET, DELAYED RELEASE ORAL at 05:22

## 2019-08-04 RX ADMIN — CEFEPIME HYDROCHLORIDE 1 G: 1 INJECTION, POWDER, FOR SOLUTION INTRAMUSCULAR; INTRAVENOUS at 08:06

## 2019-08-04 RX ADMIN — Medication 10 ML: at 08:03

## 2019-08-04 RX ADMIN — GABAPENTIN 300 MG: 300 CAPSULE ORAL at 20:20

## 2019-08-04 RX ADMIN — Medication 10 ML: at 23:13

## 2019-08-04 RX ADMIN — PRAMIPEXOLE DIHYDROCHLORIDE 0.5 MG: 0.25 TABLET ORAL at 08:02

## 2019-08-04 RX ADMIN — ACETAMINOPHEN 1000 MG: 500 TABLET, FILM COATED ORAL at 05:22

## 2019-08-04 RX ADMIN — IPRATROPIUM BROMIDE AND ALBUTEROL SULFATE 1 AMPULE: .5; 3 SOLUTION RESPIRATORY (INHALATION) at 16:18

## 2019-08-04 RX ADMIN — IPRATROPIUM BROMIDE AND ALBUTEROL SULFATE 1 AMPULE: .5; 3 SOLUTION RESPIRATORY (INHALATION) at 20:34

## 2019-08-04 RX ADMIN — GABAPENTIN 100 MG: 100 CAPSULE ORAL at 11:50

## 2019-08-04 RX ADMIN — METHYLPREDNISOLONE SODIUM SUCCINATE 40 MG: 40 INJECTION, POWDER, FOR SOLUTION INTRAMUSCULAR; INTRAVENOUS at 02:52

## 2019-08-04 RX ADMIN — METHYLPREDNISOLONE SODIUM SUCCINATE 40 MG: 40 INJECTION, POWDER, FOR SOLUTION INTRAMUSCULAR; INTRAVENOUS at 20:20

## 2019-08-04 RX ADMIN — CARVEDILOL 3.12 MG: 3.12 TABLET, FILM COATED ORAL at 08:02

## 2019-08-04 RX ADMIN — BUPROPION HYDROCHLORIDE 100 MG: 100 TABLET, EXTENDED RELEASE ORAL at 08:06

## 2019-08-04 RX ADMIN — INSULIN LISPRO 4 UNITS: 100 INJECTION, SOLUTION INTRAVENOUS; SUBCUTANEOUS at 20:22

## 2019-08-04 RX ADMIN — KETOROLAC TROMETHAMINE 15 MG: 30 INJECTION, SOLUTION INTRAMUSCULAR at 00:17

## 2019-08-04 ASSESSMENT — PAIN SCALES - GENERAL
PAINLEVEL_OUTOF10: 7
PAINLEVEL_OUTOF10: 7
PAINLEVEL_OUTOF10: 5
PAINLEVEL_OUTOF10: 8
PAINLEVEL_OUTOF10: 7

## 2019-08-04 ASSESSMENT — PAIN DESCRIPTION - DESCRIPTORS
DESCRIPTORS: ACHING;SORE

## 2019-08-04 ASSESSMENT — PAIN DESCRIPTION - PAIN TYPE
TYPE: ACUTE PAIN
TYPE: CHRONIC PAIN
TYPE: ACUTE PAIN

## 2019-08-04 ASSESSMENT — PAIN DESCRIPTION - PROGRESSION
CLINICAL_PROGRESSION: GRADUALLY WORSENING
CLINICAL_PROGRESSION: GRADUALLY WORSENING
CLINICAL_PROGRESSION: NOT CHANGED

## 2019-08-04 ASSESSMENT — PAIN DESCRIPTION - ONSET
ONSET: ON-GOING

## 2019-08-04 ASSESSMENT — PAIN DESCRIPTION - FREQUENCY
FREQUENCY: CONTINUOUS

## 2019-08-04 ASSESSMENT — PAIN DESCRIPTION - LOCATION
LOCATION: TOE (COMMENT WHICH ONE)
LOCATION: FOOT
LOCATION: BACK;FOOT;CHEST

## 2019-08-04 NOTE — PROGRESS NOTES
Speech Language Pathology  [facility]   Speech Therapy  Daily Dysphagia Treatment Note        Dom Solders  AGE: 80 y.o. GENDER: female  : 1930  [id number]  EPISODE DATE:  2019  Patient Active Problem List   Diagnosis    Multifocal pneumonia     Allergies   Allergen Reactions    Latex      rash    Ace Inhibitors     Advair Diskus [Fluticasone-Salmeterol]      \"white ring around mouth\"    Byetta 10 Mcg Pen [Exenatide]     Ciprofloxacin      hives    Codeine      Oral swelling      Contrast [Barium-Containing Compounds]      hives    Demerol Hcl [Meperidine]      seizure    Glucophage [Metformin Hcl]     Levaquin [Levofloxacin In D5w]      hives    Morphine      Oral swelling    Niacin And Related      hives    Penicillins      hives    Rocephin [Ceftriaxone]      phlebitis    Statins      Muscle cramps    Zetia [Ezetimibe]      Muscle pain     Treatment Diagnosis: Dysphagia     Chart review:   · HPI: \"89 y.o. female who presented to [facility] aggressive cough and shortness of breath for past 24 hours. Patient was admitted at [facility] last week.  At that time, she was admitted with septic shock.  Treated with IV fluids and pressors.  Quickly switched to orals and discharged home on oral doxycycline.  Did well in first couple days, but today she started having progressively worsening shortness of breath and cough.  Came to the emergency room because of inability to function.  Appears in acute distress in the emergency room. [facility], she has been doing fine with nasal cannula oxygen only. Evaluation showed patient had multifocal pneumonia.  Patient is being admitted. [address] group called for admission  at bedside. \"  · CT chest: 2019  Impression   Extensive multifocal pneumonia manifested by dense consolidation as well as   discrete and confluent scattered airspace nodularity is an underlying   ground-glass attenuation

## 2019-08-04 NOTE — PROGRESS NOTES
[facility] Pulmonary and Critical Care  Progress note      Reason for Consult: Multifocal pneumonia, respiratory failure  Requesting Physician: Dr. Veronica Rivas  Subjective:   [address] [facility]:                The patient is a 80 y.o. female with significant past medical history of:  History reviewed. No pertinent past medical history. The patient looks significantly better this morning. Speech is at the bedside. They are suggesting a modified barium swallow. Past Surgical History:    History reviewed. No pertinent surgical history.   Current Medications:    Current Facility-Administered Medications: ipratropium-albuterol (DUONEB) nebulizer solution 1 ampule, 1 ampule, Inhalation, Q4H WA  albuterol (PROVENTIL) nebulizer solution 2.5 mg, 2.5 mg, Nebulization, Q4H PRN  benzonatate (TESSALON) capsule 100 mg, 100 mg, Oral, TID PRN  insulin glargine (LANTUS) injection pen 10 Units, 10 Units, Subcutaneous, Nightly  acetaminophen (TYLENOL) tablet 1,000 mg, 1,000 mg, Oral, Q6H PRN  ketorolac (TORADOL) injection 15 mg, 15 mg, Intravenous, Q6H PRN  allopurinol (ZYLOPRIM) tablet 100 mg, 100 mg, Oral, Daily  aspirin EC tablet 81 mg, 81 mg, Oral, Daily  bumetanide (BUMEX) tablet 1 mg, 1 mg, Oral, Daily  buPROPion (WELLBUTRIN SR) extended release tablet 100 mg, 100 mg, Oral, Daily  carvedilol (COREG) tablet 3.125 mg, 3.125 mg, Oral, BID WC  gabapentin (NEURONTIN) capsule 100 mg, 100 mg, Oral, TID  levothyroxine (SYNTHROID) tablet 100 mcg, 100 mcg, Oral, Daily  LORazepam (ATIVAN) tablet 0.5 mg, 0.5 mg, Oral, QPM  pantoprazole (PROTONIX) tablet 40 mg, 40 mg, Oral, QAM AC  pramipexole (MIRAPEX) tablet 0.5 mg, 0.5 mg, Oral, BID  spironolactone (ALDACTONE) tablet 25 mg, 25 mg, Oral, Daily  cefepime (MAXIPIME) 1 g IVPB minibag, 1 g, Intravenous, Q12H  sodium chloride flush 0.9 % injection 10 mL, 10 mL, Intravenous, 2 times per day  sodium chloride flush 0.9 % injection 10 mL, 10 mL, Intravenous, PRN  magnesium hydroxide (MILK OF CONSTITUTIONAL:  negative for fevers, chills, diaphoresis, activity change, appetite change, fatigue, night sweats and unexpected weight change. EYES:  negative for blurred vision, eye discharge, visual disturbance and icterus  HEENT:  negative for hearing loss, tinnitus, ear drainage, sinus pressure, nasal congestion, epistaxis and snoring  RESPIRATORY:  See HPI  CARDIOVASCULAR:  negative for chest pain, palpitations, exertional chest pressure/discomfort, edema, syncope  GASTROINTESTINAL:  negative for nausea, vomiting, diarrhea, constipation, blood in stool and abdominal pain  GENITOURINARY:  negative for frequency, dysuria, urinary incontinence, decreased urine volume, and hematuria  HEMATOLOGIC/LYMPHATIC:  negative for easy bruising, bleeding and lymphadenopathy  ALLERGIC/IMMUNOLOGIC:  negative for recurrent infections, angioedema, anaphylaxis and drug reactions  ENDOCRINE:  negative for weight changes and diabetic symptoms including polyuria, polydipsia and polyphagia  MUSCULOSKELETAL:  negative for  pain, joint swelling, decreased range of motion and muscle weakness  NEUROLOGICAL:  negative for headaches, slurred speech, unilateral weakness  PSYCHIATRIC/BEHAVIORAL: negative for hallucinations, behavioral problems, confusion and agitation.      Objective:   PHYSICAL EXAM:      VITALS:  BP (!) 160/89   Pulse 75   Temp 98 °F (36.7 °C) (Oral)   Resp 18   Ht 5' 4\" (1.626 m)   Wt 138 lb 12.8 oz (63 kg)   SpO2 95%   BMI 23.82 kg/m²      24HR INTAKE/OUTPUT:      Intake/Output Summary (Last 24 hours) at 8/4/2019 1127  Last data filed at 8/4/2019 0515  Gross per 24 hour   Intake 720 ml   Output 1100 ml   Net -380 ml     CONSTITUTIONAL:  awake, alert, cooperative, no apparent distress, and appears stated age  NECK:  Supple, symmetrical, trachea midline, no adenopathy, thyroid symmetric, not enlarged and no tenderness, skin normal  LUNGS:  no increased work of breathing and diffuse wheezing and rhonchi to

## 2019-08-04 NOTE — PLAN OF CARE
Problem: Falls - Risk of:  Goal: Will remain free from falls  Description  Will remain free from falls  8/3/2019 2332 by Jovan Cunha RN  Outcome: Ongoing  8/3/2019 2023 by Vaughn Boas, RN  Outcome: Ongoing  8/3/2019 2017 by Vaughn Boas, RN  Outcome: Ongoing  8/3/2019 2012 by Vaughn Boas, RN  Outcome: Ongoing  Goal: Absence of physical injury  Description  Absence of physical injury  8/3/2019 2332 by Jovan Cunha RN  Outcome: Ongoing  8/3/2019 2023 by Vaughn Boas, RN  Outcome: Ongoing  8/3/2019 2017 by Vaughn Boas, RN  Outcome: Ongoing  8/3/2019 2012 by Vaughn Boas, RN  Outcome: Ongoing     Problem: Discharge Planning:  Goal: Discharged to appropriate level of care  Description  Discharged to appropriate level of care  8/3/2019 2332 by Jovan Cunha RN  Outcome: Ongoing  8/3/2019 2023 by Vaughn Boas, RN  Outcome: Ongoing  8/3/2019 2017 by Vaughn Boas, RN  Outcome: Ongoing  8/3/2019 2012 by Vaughn Boas, RN  Outcome: Ongoing  Goal: Participates in care planning  Description  Participates in care planning  8/3/2019 2332 by Jovan Cunha RN  Outcome: Ongoing  8/3/2019 2023 by Vaughn Boas, RN  Outcome: Ongoing  8/3/2019 2017 by Vaughn Boas, RN  Outcome: Ongoing  8/3/2019 2012 by Vaughn Boas, RN  Outcome: Ongoing     Problem: Airway Clearance - Ineffective:  Goal: Clear lung sounds  Description  Clear lung sounds  8/3/2019 2332 by Jovan Cunha RN  Outcome: Ongoing  8/3/2019 2023 by Vaughn Boas, RN  Outcome: Ongoing  8/3/2019 2017 by Vaughn Boas, RN  Outcome: Ongoing  8/3/2019 2012 by Vaughn Boas, RN  Outcome: Ongoing  Goal: Ability to maintain a clear airway will improve  Description  Ability to maintain a clear airway will improve  8/3/2019 2332 by Jovan Cunha RN  Outcome: Ongoing  8/3/2019 2023 by Vaughn Boas, RN  Outcome: Ongoing  8/3/2019 2017 by Vaughn Boas, RN  Outcome: Ongoing  8/3/2019 2012 by Dioni Macedo KAREN Kennedy  Outcome: Ongoing     Problem: Fluid Volume - Deficit:  Goal: Achieves intake and output within specified parameters  Description  Achieves intake and output within specified parameters  8/3/2019 2332 by Dali Levin RN  Outcome: Ongoing  8/3/2019 2023 by Ivania Sauceda RN  Outcome: Ongoing  8/3/2019 2017 by Ivania Sauceda RN  Outcome: Ongoing  8/3/2019 2012 by Ivania Sauceda RN  Outcome: Ongoing     Problem: Gas Exchange - Impaired:  Goal: Levels of oxygenation will improve  Description  Levels of oxygenation will improve  8/3/2019 2332 by Dali Levin RN  Outcome: Ongoing  8/3/2019 2023 by Ivania Sauceda RN  Outcome: Ongoing  8/3/2019 2017 by Ivania Sauceda RN  Outcome: Ongoing  8/3/2019 2012 by Ivania Sauceda RN  Outcome: Ongoing     Problem: Hyperthermia:  Goal: Ability to maintain a body temperature in the normal range will improve  Description  Ability to maintain a body temperature in the normal range will improve  8/3/2019 2332 by Dali Levin RN  Outcome: Ongoing  8/3/2019 2023 by Ivania Sauceda RN  Outcome: Ongoing  8/3/2019 2017 by Ivania Sauceda RN  Outcome: Ongoing  8/3/2019 2012 by Ivania Sauceda RN  Outcome: Ongoing     Problem: Tobacco Use:  Goal: Will participate in inpatient tobacco-use cessation counseling  Description  Will participate in inpatient tobacco-use cessation counseling  8/3/2019 2332 by Dali Levin RN  Outcome: Ongoing  8/3/2019 2023 by Ivania Sauceda RN  Outcome: Ongoing  8/3/2019 2017 by Ivania Sauceda RN  Outcome: Ongoing  8/3/2019 2012 by Ivania Sauceda RN  Outcome: Ongoing     Problem: Serum Glucose Level - Abnormal:  Goal: Ability to maintain appropriate glucose levels will improve  Description  Ability to maintain appropriate glucose levels will improve  8/3/2019 2332 by Dali Levin RN  Outcome: Ongoing  8/3/2019 2023 by Ivania Sauceda RN  Outcome: Ongoing  8/3/2019 2017 by Ivania Sauceda RN  Outcome: Ongoing

## 2019-08-04 NOTE — PLAN OF CARE
Assessment complete, see flow sheet. Lung sounds diminished in all lobes with inspiratory & expiratory wheezes in all lobes, rhonchi in anterior upper lobes & rales in posterior bases. Pulse oximeter on oxygen at 2 liters per nasal cannula 96%. Discussed plan of care regarding turn, cough & deep breath every 2 hours while awake & purse lip breathing for shortness of breath or/and wheezes as needed. Patient Verbalized understanding, turned coughed & deep breathed with fair effort & no change in lungs. Purse lip breathing with fair effort & decrease in shortness of breath. Will continue to monitor. Titus Kevin RN, BSN, PCCN.

## 2019-08-04 NOTE — PROGRESS NOTES
Physical Exam Performed:    /74   Pulse 76   Temp 98 °F (36.7 °C) (Oral)   Resp 19   Ht 5' 4\" (1.626 m)   Wt 138 lb 12.8 oz (63 kg)   SpO2 96%   BMI 23.82 kg/m²     General appearance: No apparent distress, appears stated age and cooperative. HEENT: Pupils equal, round, and reactive to light. Conjunctivae/corneas clear. Neck: Supple, with full range of motion. No jugular venous distention. Trachea midline. Respiratory:  Normal respiratory effort. No more wheezes, scattered crackles. Cardiovascular: Regular rate and rhythm with normal S1/S2 without murmurs, rubs or gallops. Abdomen: Soft, non-tender, non-distended with normal bowel sounds. Musculoskeletal: Has upper extremity clubbing, no cyanosis or edema bilaterally. Full range of motion without deformity. Skin: Skin color, texture, turgor normal.  No rashes or lesions. Neurologic:  Neurovascularly intact without any focal sensory/motor deficits.  Cranial nerves: II-XII intact, grossly non-focal.  Psychiatric: Alert and oriented, thought content appropriate, normal insight  Capillary Refill: Brisk,< 3 seconds   Peripheral Pulses: +2 palpable, equal bilaterally       Labs:   Recent Labs     08/02/19 1618 08/03/19  0548   WBC 13.2* 4.0   HGB 10.5* 9.4*   HCT 34.2* 29.6*    131*     Recent Labs     08/02/19 1618 08/03/19  0548    136   K 4.8 4.6    102   CO2 21 20*   BUN 38* 41*   CREATININE 1.1 1.2   CALCIUM 10.0 9.8     Recent Labs     08/02/19 1618 08/03/19  0548   AST 20 8*   ALT 10 8*   BILITOT 0.7 0.6   ALKPHOS 137* 121     Recent Labs     08/02/19  1618 08/03/19  0548 08/04/19  0606   INR 2.13* 2.03* 2.94*     Recent Labs     08/02/19 1618   TROPONINI <0.01       Urinalysis:      Lab Results   Component Value Date    NITRU Negative 08/02/2019    WBCUA 2 08/02/2019    RBCUA 3 08/02/2019    BLOODU Negative 08/02/2019    SPECGRAV 1.018 08/02/2019    GLUCOSEU 250 08/02/2019       Radiology:  CT CHEST WO CONTRAST

## 2019-08-04 NOTE — PROGRESS NOTES
and bed/chair exercises. Barriers to Learning: Cognitive   REQUIRES PT FOLLOW UP: Yes  Activity Tolerance  Activity Tolerance: Patient limited by endurance  Activity Tolerance: Pt with noted SOB with ambulation. SpO2 unable to be obtained as monitor would not read. Pt placed on 2L O2 after ambulation. Patient Diagnosis(es): The primary encounter diagnosis was Multifocal pneumonia. Diagnoses of Failure of outpatient treatment, COPD exacerbation ([address] Septicemia ([facility] [address] Atrial fibrillation with rapid ventricular response ([facility] [address] and Anticoagulated on Coumadin were also pertinent to this visit. has no past medical history on file. has no past surgical history on file. Restrictions  Restrictions/Precautions  Restrictions/Precautions: Fall Risk(Medium fall risk )  Position Activity Restriction  Other position/activity restrictions: 80 y.o. female who presents to the emergency department today for evaluation for shortness of breath. The patient has a past medical history of COPD, she is on oxygen continuously. She has a history of atrial fibrillation and she is on Coumadin for this. The family states that one week ago the patient was admitted to [facility], and she was admitted due to a pneumonia. They state that she was sent home on Monday. They state that the patient was doing well on Monday, Tuesday and Wednesday. However starting yesterday she had increasing shortness of breath, and she continues to have a cough which is productive of sputum. No hemoptysis. Did not seem to have worsening shortness of breath, which prompted her visit to the ED. Patient is reporting a chest tightness, she typically gets this with her COPD but she denies any true chest pain. She denies any fever chills. She denies any abdominal pain. No nausea, vomiting or diarrhea. No urinary symptoms. She has no other complaints at this time.     Vision/Hearing  Vision: Impaired  Vision Exceptions: Wears

## 2019-08-05 ENCOUNTER — APPOINTMENT (OUTPATIENT)
Dept: GENERAL RADIOLOGY | Age: 84
DRG: 178 | End: 2019-08-05
Payer: COMMERCIAL

## 2019-08-05 LAB
GLUCOSE BLD-MCNC: 220 MG/DL (ref 70–99)
GLUCOSE BLD-MCNC: 336 MG/DL (ref 70–99)
GLUCOSE BLD-MCNC: 336 MG/DL (ref 70–99)
GLUCOSE BLD-MCNC: 368 MG/DL (ref 70–99)
GLUCOSE BLD-MCNC: 371 MG/DL (ref 70–99)
INR BLD: 4.68 (ref 0.86–1.14)
PERFORMED ON: ABNORMAL
PROTHROMBIN TIME: 53.4 SEC (ref 9.8–13)

## 2019-08-05 PROCEDURE — 71045 X-RAY EXAM CHEST 1 VIEW: CPT

## 2019-08-05 PROCEDURE — 2060000000 HC ICU INTERMEDIATE R&B

## 2019-08-05 PROCEDURE — 99232 SBSQ HOSP IP/OBS MODERATE 35: CPT | Performed by: INTERNAL MEDICINE

## 2019-08-05 PROCEDURE — 36415 COLL VENOUS BLD VENIPUNCTURE: CPT

## 2019-08-05 PROCEDURE — 85610 PROTHROMBIN TIME: CPT

## 2019-08-05 PROCEDURE — 6370000000 HC RX 637 (ALT 250 FOR IP): Performed by: INTERNAL MEDICINE

## 2019-08-05 PROCEDURE — 74230 X-RAY XM SWLNG FUNCJ C+: CPT

## 2019-08-05 PROCEDURE — 92526 ORAL FUNCTION THERAPY: CPT

## 2019-08-05 PROCEDURE — 94669 MECHANICAL CHEST WALL OSCILL: CPT

## 2019-08-05 PROCEDURE — 94761 N-INVAS EAR/PLS OXIMETRY MLT: CPT

## 2019-08-05 PROCEDURE — 92611 MOTION FLUOROSCOPY/SWALLOW: CPT

## 2019-08-05 PROCEDURE — 2580000003 HC RX 258: Performed by: INTERNAL MEDICINE

## 2019-08-05 PROCEDURE — 6360000002 HC RX W HCPCS: Performed by: INTERNAL MEDICINE

## 2019-08-05 PROCEDURE — 97530 THERAPEUTIC ACTIVITIES: CPT

## 2019-08-05 PROCEDURE — 97116 GAIT TRAINING THERAPY: CPT

## 2019-08-05 PROCEDURE — 94640 AIRWAY INHALATION TREATMENT: CPT

## 2019-08-05 RX ORDER — DOXYCYCLINE HYCLATE 100 MG
100 TABLET ORAL EVERY 12 HOURS SCHEDULED
Status: DISCONTINUED | OUTPATIENT
Start: 2019-08-05 | End: 2019-08-08 | Stop reason: HOSPADM

## 2019-08-05 RX ORDER — PREDNISONE 20 MG/1
40 TABLET ORAL DAILY
Status: DISCONTINUED | OUTPATIENT
Start: 2019-08-06 | End: 2019-08-08 | Stop reason: ALTCHOICE

## 2019-08-05 RX ADMIN — CEFEPIME HYDROCHLORIDE 1 G: 1 INJECTION, POWDER, FOR SOLUTION INTRAMUSCULAR; INTRAVENOUS at 09:32

## 2019-08-05 RX ADMIN — LORAZEPAM 0.5 MG: 0.5 TABLET ORAL at 21:28

## 2019-08-05 RX ADMIN — PRAMIPEXOLE DIHYDROCHLORIDE 0.5 MG: 0.25 TABLET ORAL at 09:31

## 2019-08-05 RX ADMIN — METHYLPREDNISOLONE SODIUM SUCCINATE 40 MG: 40 INJECTION, POWDER, FOR SOLUTION INTRAMUSCULAR; INTRAVENOUS at 03:30

## 2019-08-05 RX ADMIN — SPIRONOLACTONE 25 MG: 25 TABLET ORAL at 09:31

## 2019-08-05 RX ADMIN — PRAMIPEXOLE DIHYDROCHLORIDE 0.5 MG: 0.25 TABLET ORAL at 21:26

## 2019-08-05 RX ADMIN — Medication 10 ML: at 21:27

## 2019-08-05 RX ADMIN — IPRATROPIUM BROMIDE AND ALBUTEROL SULFATE 1 AMPULE: .5; 3 SOLUTION RESPIRATORY (INHALATION) at 16:05

## 2019-08-05 RX ADMIN — CARVEDILOL 3.12 MG: 3.12 TABLET, FILM COATED ORAL at 17:50

## 2019-08-05 RX ADMIN — ASPIRIN 81 MG: 81 TABLET ORAL at 09:32

## 2019-08-05 RX ADMIN — INSULIN LISPRO 10 UNITS: 100 INJECTION, SOLUTION INTRAVENOUS; SUBCUTANEOUS at 09:37

## 2019-08-05 RX ADMIN — INSULIN LISPRO 12 UNITS: 100 INJECTION, SOLUTION INTRAVENOUS; SUBCUTANEOUS at 17:50

## 2019-08-05 RX ADMIN — GABAPENTIN 100 MG: 100 CAPSULE ORAL at 17:50

## 2019-08-05 RX ADMIN — DOXYCYCLINE HYCLATE 100 MG: 100 TABLET, COATED ORAL at 21:27

## 2019-08-05 RX ADMIN — INSULIN LISPRO 10 UNITS: 100 INJECTION, SOLUTION INTRAVENOUS; SUBCUTANEOUS at 12:52

## 2019-08-05 RX ADMIN — INSULIN GLARGINE 15 UNITS: 100 INJECTION, SOLUTION SUBCUTANEOUS at 14:25

## 2019-08-05 RX ADMIN — PANTOPRAZOLE SODIUM 40 MG: 40 TABLET, DELAYED RELEASE ORAL at 06:01

## 2019-08-05 RX ADMIN — METHYLPREDNISOLONE SODIUM SUCCINATE 40 MG: 40 INJECTION, POWDER, FOR SOLUTION INTRAMUSCULAR; INTRAVENOUS at 10:38

## 2019-08-05 RX ADMIN — BUMETANIDE 1 MG: 1 TABLET ORAL at 12:54

## 2019-08-05 RX ADMIN — GABAPENTIN 300 MG: 300 CAPSULE ORAL at 21:27

## 2019-08-05 RX ADMIN — CARVEDILOL 3.12 MG: 3.12 TABLET, FILM COATED ORAL at 09:31

## 2019-08-05 RX ADMIN — GABAPENTIN 100 MG: 100 CAPSULE ORAL at 09:31

## 2019-08-05 RX ADMIN — CEFEPIME HYDROCHLORIDE 1 G: 1 INJECTION, POWDER, FOR SOLUTION INTRAMUSCULAR; INTRAVENOUS at 21:30

## 2019-08-05 RX ADMIN — GABAPENTIN 100 MG: 100 CAPSULE ORAL at 14:30

## 2019-08-05 RX ADMIN — INSULIN LISPRO 3 UNITS: 100 INJECTION, SOLUTION INTRAVENOUS; SUBCUTANEOUS at 21:27

## 2019-08-05 RX ADMIN — INSULIN GLARGINE 10 UNITS: 100 INJECTION, SOLUTION SUBCUTANEOUS at 21:28

## 2019-08-05 RX ADMIN — LEVOTHYROXINE SODIUM 100 MCG: 100 TABLET ORAL at 06:01

## 2019-08-05 RX ADMIN — IPRATROPIUM BROMIDE AND ALBUTEROL SULFATE 1 AMPULE: .5; 3 SOLUTION RESPIRATORY (INHALATION) at 13:01

## 2019-08-05 RX ADMIN — ALBUTEROL SULFATE 2.5 MG: 2.5 SOLUTION RESPIRATORY (INHALATION) at 05:18

## 2019-08-05 RX ADMIN — ALLOPURINOL 100 MG: 100 TABLET ORAL at 09:32

## 2019-08-05 RX ADMIN — IPRATROPIUM BROMIDE AND ALBUTEROL SULFATE 1 AMPULE: .5; 3 SOLUTION RESPIRATORY (INHALATION) at 08:18

## 2019-08-05 RX ADMIN — Medication 10 ML: at 10:38

## 2019-08-05 RX ADMIN — BUPROPION HYDROCHLORIDE 100 MG: 100 TABLET, EXTENDED RELEASE ORAL at 09:31

## 2019-08-05 RX ADMIN — DOXYCYCLINE HYCLATE 100 MG: 100 TABLET, COATED ORAL at 09:31

## 2019-08-05 RX ADMIN — IPRATROPIUM BROMIDE AND ALBUTEROL SULFATE 1 AMPULE: .5; 3 SOLUTION RESPIRATORY (INHALATION) at 20:53

## 2019-08-05 ASSESSMENT — PAIN DESCRIPTION - PAIN TYPE: TYPE: NEUROPATHIC PAIN

## 2019-08-05 ASSESSMENT — PAIN DESCRIPTION - FREQUENCY: FREQUENCY: CONTINUOUS

## 2019-08-05 ASSESSMENT — PAIN SCALES - GENERAL
PAINLEVEL_OUTOF10: 0
PAINLEVEL_OUTOF10: 8
PAINLEVEL_OUTOF10: 0

## 2019-08-05 ASSESSMENT — PAIN DESCRIPTION - LOCATION: LOCATION: FOOT

## 2019-08-05 ASSESSMENT — PAIN DESCRIPTION - DESCRIPTORS: DESCRIPTORS: ACHING

## 2019-08-05 NOTE — PROGRESS NOTES
cbg remains elevated - instructed on current plan for SSI, and advance to high dose SSI; mild end exp wheezes and scattered rhonchi bilat, though only minimal air movement evident at this time - awaiting HHN; no other s/s of acute distress at this time; pt's sister at bedside.

## 2019-08-05 NOTE — PROCEDURES
[address]  MODIFIED BARIUM SWALLOW EVALUATION    Patient's Name: Dorota Jimenez. O.B: 1/16/1930  Medical Diagnosis: Multifocal pneumonia [J18.9]  Multifocal pneumonia [J18.9]  Treatment Diagnosis: Dysphagia    Ordering MD: Dr. Jen Oconnell   Radiologist: Dr. Digna Woody   Date of Evaluation: 8/5/2019  Type of Study: Modified Barium Swallowing Study (MBS)  Diet Prior to Study: Dysphagia II Minced and Moist with Nectar thick, meds with puree  Pain Level: Denies at this time     Impression:  Modified Barium Swallow evaluation completed on 8/5/2019. Results indicate moderate oropharyngeal dysphagia. Thin liquids revealed episodes of rapid premature bolus loss with deep laryngeal penetration during the swallow and aspiration after the swallow as well as episodes of aspiration during the swallow. Pt demonstrated suspected reduced sensation for timely reflexive cough in the presence of aspiration. Nectar thick liquids with large bolus revealed multiple swallows to clear with laryngeal penetration during the swallow. Reflexive throat clear appeared to clear penetration with nectar thick liquids. Overall dysphagia was characterized by impaired mastication, decreased A-P propulsion, premature bolus loss, deep pharyngeal pooling, delayed swallow initiation, reduced airway protection and impaired pharyngeal clearing. Improved mastication and clearing was viewed with soft solids vs regular solids. Thin and nectar thick liquids were noted to intermittently pool to the pyriforms prior to swallow initiation. Honey thick liquids pooled to the vallecular space prior to initiation. Mild pharyngeal stasis was assessed for all textures after pt utilized multiple swallows to clear. Overall pt demonstrates increased risk for aspiration/aspiration PNA due to deep pharyngeal pooling, impaired airway protection, impaired pharyngeal clearing and episodes of aspiration with absent/delayed reflexive cough. reduced sensation for timely reflexive cough in the presence of aspiration     Esophageal Phase  -Delayed clearing through upper 1/3rd     Following Evaluation:  Results/recommendations and education given to Patient and nurse, who verbalized understanding    Timed Code Treatment:  0 minutes     Total Treatment Time:  40 minutes     Ed Yoon [id number]  Speech Language Pathologist

## 2019-08-05 NOTE — PROGRESS NOTES
[facility] Pulmonary and Critical Care  Progress note      Reason for Consult: Multifocal pneumonia, respiratory failure  Requesting Physician: Dr. Reveles Room  Subjective:   [facility]:                The patient is a 80 y.o. female with significant past medical history of:  History reviewed. No pertinent past medical history. Interval history: Still continues to be short of breath with a cough-unable to bring up phlegm. On 2 L O2. Denies any chest discomfort. Awaiting modified barium swallow. Past Surgical History:    History reviewed. No pertinent surgical history.   Current Medications:    Current Facility-Administered Medications: doxycycline hyclate (VIBRA-TABS) tablet 100 mg, 100 mg, Oral, 2 times per day  warfarin (COUMADIN) daily dosing (placeholder), , Other, RX Placeholder  ipratropium-albuterol (DUONEB) nebulizer solution 1 ampule, 1 ampule, Inhalation, Q4H WA  albuterol (PROVENTIL) nebulizer solution 2.5 mg, 2.5 mg, Nebulization, Q4H PRN  benzonatate (TESSALON) capsule 100 mg, 100 mg, Oral, TID PRN  insulin glargine (LANTUS) injection pen 10 Units, 10 Units, Subcutaneous, Nightly  acetaminophen (TYLENOL) tablet 1,000 mg, 1,000 mg, Oral, Q6H PRN  ketorolac (TORADOL) injection 15 mg, 15 mg, Intravenous, Q6H PRN  allopurinol (ZYLOPRIM) tablet 100 mg, 100 mg, Oral, Daily  aspirin EC tablet 81 mg, 81 mg, Oral, Daily  bumetanide (BUMEX) tablet 1 mg, 1 mg, Oral, Daily  buPROPion (WELLBUTRIN SR) extended release tablet 100 mg, 100 mg, Oral, Daily  carvedilol (COREG) tablet 3.125 mg, 3.125 mg, Oral, BID WC  gabapentin (NEURONTIN) capsule 100 mg, 100 mg, Oral, TID  levothyroxine (SYNTHROID) tablet 100 mcg, 100 mcg, Oral, Daily  LORazepam (ATIVAN) tablet 0.5 mg, 0.5 mg, Oral, QPM  pantoprazole (PROTONIX) tablet 40 mg, 40 mg, Oral, QAM AC  pramipexole (MIRAPEX) tablet 0.5 mg, 0.5 mg, Oral, BID  spironolactone (ALDACTONE) tablet 25 mg, 25 mg, Oral, Daily  cefepime (MAXIPIME) 1 g IVPB minibag, 1 g, negative for fevers, chills, diaphoresis, activity change, appetite change, fatigue, night sweats and unexpected weight change. EYES:  negative for blurred vision, eye discharge, visual disturbance and icterus  HEENT:  negative for hearing loss, tinnitus, ear drainage, sinus pressure, nasal congestion, epistaxis and snoring  RESPIRATORY:  See HPI  CARDIOVASCULAR:  negative for chest pain, palpitations, exertional chest pressure/discomfort, edema, syncope  GASTROINTESTINAL:  negative for nausea, vomiting, diarrhea, constipation, blood in stool and abdominal pain  GENITOURINARY:  negative for frequency, dysuria, urinary incontinence, decreased urine volume, and hematuria  HEMATOLOGIC/LYMPHATIC:  negative for easy bruising, bleeding and lymphadenopathy  ALLERGIC/IMMUNOLOGIC:  negative for recurrent infections, angioedema, anaphylaxis and drug reactions  ENDOCRINE:  negative for weight changes and diabetic symptoms including polyuria, polydipsia and polyphagia  MUSCULOSKELETAL:  negative for  pain, joint swelling, decreased range of motion and muscle weakness  NEUROLOGICAL:  negative for headaches, slurred speech, unilateral weakness  PSYCHIATRIC/BEHAVIORAL: negative for hallucinations, behavioral problems, confusion and agitation. Objective:   PHYSICAL EXAM:      VITALS:  /70   Pulse 102   Temp 97.1 °F (36.2 °C) (Axillary)   Resp 19   Ht 5' 4\" (1.626 m)   Wt 139 lb 12.8 oz (63.4 kg)   SpO2 94%   BMI 24.00 kg/m²      24HR INTAKE/OUTPUT:      Intake/Output Summary (Last 24 hours) at 8/5/2019 7715  Last data filed at 8/5/2019 0139  Gross per 24 hour   Intake 720 ml   Output --   Net 720 ml     CONSTITUTIONAL:  awake, alert, cooperative, no apparent distress, and appears stated age  NECK:  Supple, symmetrical, trachea midline, no adenopathy, thyroid symmetric, not enlarged and no tenderness, skin normal  LUNGS:  no increased work of breathing and diffuse wheezing and rhonchi to auscultation.  No

## 2019-08-05 NOTE — PROGRESS NOTES
feet. RN aware. Pain Assessment  Pain Assessment: 0-10  Pain Level: 8  Pain Type: Neuropathic pain  Pain Location: Foot(B feet)  Pain Descriptors: Aching  Pain Frequency: Continuous  Vital Signs  BP: 117/69  BP Location: Left upper arm  Patient Position: Up in chair  Oxygen Therapy  SpO2: (!) 88 %(O2 levels decreased to 88 %upon standing. O2 levels dropped to 76% after ambulation)  Pulse Oximeter Device Mode: Intermittent  Pulse Oximeter Device Location: Left;Finger  O2 Device: Nasal cannula(Pt on 1.5L O2)  Patient Observation  Observations: Pt oriented up in chair       Orientation  Orientation  Overall Orientation Status: Within Functional Limits  Cognition      Objective   Bed mobility  Comment: Not performed d/t Pt up in chair upon arrival  Transfers  Sit to Stand: Minimal Assistance(min assist x1)  Stand to sit: Contact guard assistance(CGA x1)  Ambulation  Ambulation?: Yes  Ambulation 1  Surface: level tile  Device: Rolling Walker  Assistance: Contact guard assistance(CGA x1)  Quality of Gait: decreased step length, decreased lisa, ER of B LEs,   Distance: 76'  Comments: Pt required vc's to stay inside RW and to stand upright. Increased use of UEs for support on RW leading to fatigue of BUEs. Pt with decreased use of UEs with cues. Pt with no LOB but noted SOB with ambulation. Stairs/Curb  Stairs?: No  Gait Deviations  Gait Deviations: Slow Lisa;Decreased step length(Arm swing not observed d/t walker use)     Balance  Sitting - Static: Good  Sitting - Dynamic: Good  Standing - Static: +  Standing - Dynamic: Fair;+      Goals  Short term goals  Time Frame for Short term goals: To be completed upon discharge   Short term goal 1: Pt will perform all bed mobility independently. Short term goal 2: Pt will perform sit to stand with LRAD and mod I   Short term goal 3: Pt will ambulate 100' with LRAD and supervision. Short term goal 4: Pt will ascend/descend 1 step with handrail and supervision. Patient Goals   Patient goals : To go home with home therapy  NO GOALS MET THIS DATE    Plan    Plan  Times per week: 3-5  Times per day: Daily  Current Treatment Recommendations: Strengthening, Transfer Training, Endurance Training, Patient/Caregiver Education & Training, Balance Training, Gait Training, Home Exercise Program, Functional Mobility Training, Stair training, Safety Education & Training  Safety Devices  Type of devices: All fall risk precautions in place, Chair alarm in place, Gait belt, Patient at risk for falls, Left in chair, Call light within reach , Nurse Notified    Therapy Time   Individual Concurrent Group Co-treatment   Time In 0941         Time Out 1036         Minutes 55            Timed Code Treatment Minutes:  55    Total Treatment Minutes:  [address], [facility]    PT providing direct supervision during session and assisting in making skilled judgements throughout session.   [id number] [facility] PT, DPT [id number]    [id number] [facility], PT

## 2019-08-05 NOTE — PROGRESS NOTES
Reviewed MBS findings with pt - she affirms awareness of need for thickened liquids, and states \"you'll have to talk to my son about that\" (pt resides with son); no other significant changes since earlier assessment, except as noted; no s/s of acute distress at present.

## 2019-08-06 LAB
A/G RATIO: 1 (ref 1.1–2.2)
ALBUMIN SERPL-MCNC: 3 G/DL (ref 3.4–5)
ALP BLD-CCNC: 114 U/L (ref 40–129)
ALT SERPL-CCNC: 9 U/L (ref 10–40)
ANION GAP SERPL CALCULATED.3IONS-SCNC: 11 MMOL/L (ref 3–16)
AST SERPL-CCNC: 8 U/L (ref 15–37)
BILIRUB SERPL-MCNC: 0.4 MG/DL (ref 0–1)
BUN BLDV-MCNC: 58 MG/DL (ref 7–20)
CALCIUM SERPL-MCNC: 10.5 MG/DL (ref 8.3–10.6)
CHLORIDE BLD-SCNC: 97 MMOL/L (ref 99–110)
CO2: 24 MMOL/L (ref 21–32)
CREAT SERPL-MCNC: 1.3 MG/DL (ref 0.6–1.2)
GFR AFRICAN AMERICAN: 47
GFR NON-AFRICAN AMERICAN: 39
GLOBULIN: 3 G/DL
GLUCOSE BLD-MCNC: 107 MG/DL (ref 70–99)
GLUCOSE BLD-MCNC: 198 MG/DL (ref 70–99)
GLUCOSE BLD-MCNC: 278 MG/DL (ref 70–99)
GLUCOSE BLD-MCNC: 309 MG/DL (ref 70–99)
GLUCOSE BLD-MCNC: 328 MG/DL (ref 70–99)
GLUCOSE BLD-MCNC: 97 MG/DL (ref 70–99)
HCT VFR BLD CALC: 37.8 % (ref 36–48)
HEMOGLOBIN: 11.7 G/DL (ref 12–16)
INR BLD: 4.77 (ref 0.86–1.14)
MCH RBC QN AUTO: 26.8 PG (ref 26–34)
MCHC RBC AUTO-ENTMCNC: 31 G/DL (ref 31–36)
MCV RBC AUTO: 86.5 FL (ref 80–100)
PDW BLD-RTO: 18.2 % (ref 12.4–15.4)
PERFORMED ON: ABNORMAL
PERFORMED ON: NORMAL
PLATELET # BLD: 152 K/UL (ref 135–450)
PMV BLD AUTO: 8.9 FL (ref 5–10.5)
POTASSIUM SERPL-SCNC: 4.1 MMOL/L (ref 3.5–5.1)
PROTHROMBIN TIME: 54.4 SEC (ref 9.8–13)
RBC # BLD: 4.37 M/UL (ref 4–5.2)
SODIUM BLD-SCNC: 132 MMOL/L (ref 136–145)
TOTAL PROTEIN: 6 G/DL (ref 6.4–8.2)
WBC # BLD: 6.9 K/UL (ref 4–11)

## 2019-08-06 PROCEDURE — 36415 COLL VENOUS BLD VENIPUNCTURE: CPT

## 2019-08-06 PROCEDURE — 2580000003 HC RX 258: Performed by: INTERNAL MEDICINE

## 2019-08-06 PROCEDURE — 97535 SELF CARE MNGMENT TRAINING: CPT

## 2019-08-06 PROCEDURE — 94640 AIRWAY INHALATION TREATMENT: CPT

## 2019-08-06 PROCEDURE — 2060000000 HC ICU INTERMEDIATE R&B

## 2019-08-06 PROCEDURE — 85027 COMPLETE CBC AUTOMATED: CPT

## 2019-08-06 PROCEDURE — 85610 PROTHROMBIN TIME: CPT

## 2019-08-06 PROCEDURE — 6370000000 HC RX 637 (ALT 250 FOR IP): Performed by: INTERNAL MEDICINE

## 2019-08-06 PROCEDURE — 80053 COMPREHEN METABOLIC PANEL: CPT

## 2019-08-06 PROCEDURE — 94761 N-INVAS EAR/PLS OXIMETRY MLT: CPT

## 2019-08-06 PROCEDURE — 92526 ORAL FUNCTION THERAPY: CPT

## 2019-08-06 PROCEDURE — 94669 MECHANICAL CHEST WALL OSCILL: CPT

## 2019-08-06 PROCEDURE — 2700000000 HC OXYGEN THERAPY PER DAY

## 2019-08-06 PROCEDURE — 97530 THERAPEUTIC ACTIVITIES: CPT

## 2019-08-06 PROCEDURE — 99232 SBSQ HOSP IP/OBS MODERATE 35: CPT | Performed by: INTERNAL MEDICINE

## 2019-08-06 PROCEDURE — 6360000002 HC RX W HCPCS: Performed by: INTERNAL MEDICINE

## 2019-08-06 RX ADMIN — IPRATROPIUM BROMIDE AND ALBUTEROL SULFATE 1 AMPULE: .5; 3 SOLUTION RESPIRATORY (INHALATION) at 15:39

## 2019-08-06 RX ADMIN — GABAPENTIN 100 MG: 100 CAPSULE ORAL at 09:44

## 2019-08-06 RX ADMIN — CARVEDILOL 3.12 MG: 3.12 TABLET, FILM COATED ORAL at 17:01

## 2019-08-06 RX ADMIN — PANTOPRAZOLE SODIUM 40 MG: 40 TABLET, DELAYED RELEASE ORAL at 09:49

## 2019-08-06 RX ADMIN — INSULIN LISPRO 3 UNITS: 100 INJECTION, SOLUTION INTRAVENOUS; SUBCUTANEOUS at 12:43

## 2019-08-06 RX ADMIN — ASPIRIN 81 MG: 81 TABLET ORAL at 09:43

## 2019-08-06 RX ADMIN — PREDNISONE 40 MG: 20 TABLET ORAL at 09:43

## 2019-08-06 RX ADMIN — IPRATROPIUM BROMIDE AND ALBUTEROL SULFATE 1 AMPULE: .5; 3 SOLUTION RESPIRATORY (INHALATION) at 21:06

## 2019-08-06 RX ADMIN — BUPROPION HYDROCHLORIDE 100 MG: 100 TABLET, EXTENDED RELEASE ORAL at 09:43

## 2019-08-06 RX ADMIN — Medication 10 ML: at 21:07

## 2019-08-06 RX ADMIN — IPRATROPIUM BROMIDE AND ALBUTEROL SULFATE 1 AMPULE: .5; 3 SOLUTION RESPIRATORY (INHALATION) at 08:33

## 2019-08-06 RX ADMIN — GABAPENTIN 300 MG: 300 CAPSULE ORAL at 21:02

## 2019-08-06 RX ADMIN — INSULIN LISPRO 6 UNITS: 100 INJECTION, SOLUTION INTRAVENOUS; SUBCUTANEOUS at 21:02

## 2019-08-06 RX ADMIN — INSULIN GLARGINE 10 UNITS: 100 INJECTION, SOLUTION SUBCUTANEOUS at 21:06

## 2019-08-06 RX ADMIN — LORAZEPAM 0.5 MG: 0.5 TABLET ORAL at 21:02

## 2019-08-06 RX ADMIN — LEVOTHYROXINE SODIUM 100 MCG: 100 TABLET ORAL at 09:49

## 2019-08-06 RX ADMIN — GABAPENTIN 100 MG: 100 CAPSULE ORAL at 17:01

## 2019-08-06 RX ADMIN — Medication 10 ML: at 09:44

## 2019-08-06 RX ADMIN — CEFEPIME HYDROCHLORIDE 1 G: 1 INJECTION, POWDER, FOR SOLUTION INTRAMUSCULAR; INTRAVENOUS at 09:49

## 2019-08-06 RX ADMIN — GABAPENTIN 100 MG: 100 CAPSULE ORAL at 12:42

## 2019-08-06 RX ADMIN — PRAMIPEXOLE DIHYDROCHLORIDE 0.5 MG: 0.25 TABLET ORAL at 21:02

## 2019-08-06 RX ADMIN — INSULIN LISPRO 12 UNITS: 100 INJECTION, SOLUTION INTRAVENOUS; SUBCUTANEOUS at 17:01

## 2019-08-06 RX ADMIN — PRAMIPEXOLE DIHYDROCHLORIDE 0.5 MG: 0.25 TABLET ORAL at 09:43

## 2019-08-06 RX ADMIN — DOXYCYCLINE HYCLATE 100 MG: 100 TABLET, COATED ORAL at 21:01

## 2019-08-06 RX ADMIN — SPIRONOLACTONE 25 MG: 25 TABLET ORAL at 09:43

## 2019-08-06 RX ADMIN — DOXYCYCLINE HYCLATE 100 MG: 100 TABLET, COATED ORAL at 09:43

## 2019-08-06 RX ADMIN — ALLOPURINOL 100 MG: 100 TABLET ORAL at 09:43

## 2019-08-06 ASSESSMENT — PAIN SCALES - GENERAL
PAINLEVEL_OUTOF10: 0

## 2019-08-06 NOTE — PROGRESS NOTES
glucagon (rDNA) injection 1 mg PRN   dextrose 5 % solution PRN   gabapentin (NEURONTIN) capsule 300 mg QPM       Objective:  /69   Pulse 84   Temp 97.3 °F (36.3 °C) (Oral)   Resp 18   Ht 5' 4\" (1.626 m)   Wt 138 lb 6.4 oz (62.8 kg)   SpO2 95%   BMI 23.76 kg/m²   No intake or output data in the 24 hours ending 08/06/19 1250   Wt Readings from Last 3 Encounters:   08/06/19 138 lb 6.4 oz (62.8 kg)       General appearance:  Appears comfortable  Eyes: Sclera clear. Pupils equal.  ENT: Moist oral mucosa. Trachea midline, no adenopathy. Cardiovascular: Regular rhythm, normal S1, S2. No murmur. No edema in lower extremities  Respiratory: Not using accessory muscles. Good inspiratory effort. Clear to auscultation bilaterally, no wheeze or crackles. GI: Abdomen soft, no tenderness, not distended, normal bowel sounds  Musculoskeletal: No cyanosis in digits, neck supple  Neurology: CN 2-12 grossly intact. No speech or motor deficits  Psych: Normal affect. Alert and oriented in time, place and person  Skin: Warm, dry, normal turgor  Extremity exam shows brisk capillary refill. Peripheral pulses are palpable in lower extremities     Labs and Tests:  CBC:   No results for input(s): WBC, HGB, PLT in the last 72 hours. BMP:  No results for input(s): NA, K, CL, CO2, BUN, CREATININE, GLUCOSE in the last 72 hours. Hepatic: No results for input(s): AST, ALT, ALB, BILITOT, ALKPHOS in the last 72 hours. Fluoroscopy modified barium swallow with video   Final Result   Aspiration with thin liquid. Please see separate speech pathology report for full discussion of findings   and recommendations. XR CHEST PORTABLE   Final Result   Persistent multifocal bilateral airspace disease, suggestive of pneumonia. Follow-up to resolution recommended.          CT CHEST WO CONTRAST   Final Result   Extensive multifocal pneumonia manifested by dense consolidation as well as   discrete and confluent scattered airspace nodularity is an underlying   ground-glass attenuation maximal in the lower lobes and relatively sparing   left upper lobe and lung apices. There is reactive mediastinal   lymphadenopathy. Problem List  Active Problems:    Multifocal pneumonia  Resolved Problems:    * No resolved hospital problems. *       Assessment & Plan:     Multifocal pneumonia. On cefepime and doxycycline sputum culture growing Pseudomonas ID sensitivity pending pulmonary consulted    Chronic hypoxemic respiratory failure, oxygen dependent at home. COPD exacerbation p.o. steroid    Chronic atrial fibrillation. On Coumadin INR supratherapeutic pharmacy to dose    Chronic kidney disease stage III    Hyperglycemia control improving continue current Lantus and sliding scale        Diet: Dietary Nutrition Supplements: Diabetic Oral Supplement  DIET DYSPHAGIA SOFT AND BITE-SIZED; Mildly Thick (Nectar);  No Drinking Straw  Code:Full Code  DVT PPX Coumadin  Disposition Home 1 to 2 days      Berna Zuleta MD   2019 12:50 PM

## 2019-08-06 NOTE — PROGRESS NOTES
General  Chart Reviewed: Yes  Response to previous treatment: Patient with no complaints from previous session  Family / Caregiver Present: No  Diagnosis: Multifocal Pneumonia  Subjective  Subjective: pt supine upon arrival and agreeable to OT session, denies pain   Vital Signs  Patient Currently in Pain: Denies   Orientation  Orientation  Overall Orientation Status: Within Functional Limits  Objective    ADL  Feeding: Setup(provided with mildly thick (nectar) water. Educated on how to thicken beverages)  Grooming: Contact guard assistance(in stance at sink, SOB noted)  UE Bathing: Supervision(seated on toilet for UB bathing)  LE Bathing: Contact guard assistance(in stance for zenon care, LB bathing seated with supervision)  UE Dressing: Supervision; Increased time to complete  LE Dressing: Increased time to complete;Minimal assistance(donning brief, pants, and socks. educated on lacing L leg first secondary to decreased ROM)  Toileting: Contact guard assistance(steadying assist in stance for zenon care)  Additional Comments: pt walked to/from bathroom with use of RW. SOB noted when seated on toilet. Pt urinated and performed bathing seated        Balance  Sitting Balance: Supervision  Standing Balance: Contact guard assistance  Standing Balance  Time: ~5 minutes total with RW to/from bathroom. seated for rest break and requesting walk in hallway. ~10 minutes total (walked to/from nourishment) education on procedures to thicken liquids. pt on 2 L O2 at 98%, difficulty obtaining reading secondary to cold fingers  Functional Mobility  Functional - Mobility Device: Rolling Walker  Activity: To/from bathroom; Other  Assist Level: Contact guard assistance  Functional Mobility Comments: CGA to SBA with RW. no LOB, slow lisa, increased SOB noted  Toilet Transfers  Toilet - Technique: Ambulating  Equipment Used: Standard toilet  Toilet Transfer: Stand by assistance  Toilet Transfers Comments: grab bar used, difficulty

## 2019-08-06 NOTE — PROGRESS NOTES
cough.\"    Assessment of Texture Tolerance:  -Impressions: Pt was positioned upright in bed on O2 via nasal cannula. Son was present for session. Son and pt denied any difficulty with breakfast this AM. Son reported that pt has had longstanding difficulty with swallowing but they have never really addressed it. Son reported pt lives with him and [place] and the plan is to return in the beginning of September. Extensive education was provided to pt and son re; results of Modified Barium Swallow with video frames viewed in PACS, rationale for diet recommends, aspiration viewed on MBS, increased risk for aspiration PNA and safe swallow strategies. Handouts were provided to pt and son re;dysphagia, oral care, aspiration, aspiration PNA, COPD and swallowing, thickened liquids, diet level recommendations, safe swallow strategies, reflux precautions. SLP demonstrated to son and pt proper way to thicken liquids. Pt and son verbalized understanding of education. Diet and Treatment Recommendations:  Continue current diet recommendations. (Dysphagia Goals addressed, if appropriate)  1)Pt will tolerate diet with no signs or symptoms of aspiration (ongoing 8/6/2019)     Plan:  Continued daily Dysphagia treatment with goals per plan of care. Patient/Family Education:Education given to the Pt and nurse, who verbalized understanding    Discharge Recommendations:  Pt will benefit from continued skilled Speech Therapy for Dysphagia services, prior to returning home.  Recommend ongoing dysphagia tx with SLP upon d/c from hospital.     Timed Code Treatment:  0 minutes     Total Treatment Time:  25 minutes     Zack Osborn, [address] [place] [address] Brigette Belle #[id number]  Speech Language Pathologist

## 2019-08-06 NOTE — PROGRESS NOTES
chills, diaphoresis, activity change, appetite change, fatigue, night sweats and unexpected weight change. EYES:  negative for blurred vision, eye discharge, visual disturbance and icterus  HEENT:  negative for hearing loss, tinnitus, ear drainage, sinus pressure, nasal congestion, epistaxis and snoring  RESPIRATORY:  See HPI  CARDIOVASCULAR:  negative for chest pain, palpitations, exertional chest pressure/discomfort, edema, syncope  GASTROINTESTINAL:  negative for nausea, vomiting, diarrhea, constipation, blood in stool and abdominal pain  GENITOURINARY:  negative for frequency, dysuria, urinary incontinence, decreased urine volume, and hematuria  HEMATOLOGIC/LYMPHATIC:  negative for easy bruising, bleeding and lymphadenopathy  ALLERGIC/IMMUNOLOGIC:  negative for recurrent infections, angioedema, anaphylaxis and drug reactions  ENDOCRINE:  negative for weight changes and diabetic symptoms including polyuria, polydipsia and polyphagia  MUSCULOSKELETAL:  negative for  pain, joint swelling, decreased range of motion and muscle weakness  NEUROLOGICAL:  negative for headaches, slurred speech, unilateral weakness  PSYCHIATRIC/BEHAVIORAL: negative for hallucinations, behavioral problems, confusion and agitation. Objective:   PHYSICAL EXAM:      VITALS:  BP (!) 98/57   Pulse 73   Temp 97.1 °F (36.2 °C) (Oral)   Resp 18   Ht 5' 4\" (1.626 m)   Wt 138 lb 6.4 oz (62.8 kg)   SpO2 95%   BMI 23.76 kg/m²      24HR INTAKE/OUTPUT:    No intake or output data in the 24 hours ending 08/06/19 1156  CONSTITUTIONAL:  awake, alert, cooperative, no apparent distress, and appears stated age  NECK:  Supple, symmetrical, trachea midline, no adenopathy, thyroid symmetric, not enlarged and no tenderness, skin normal  LUNGS:  no increased work of breathing and diffuse wheezing and rhonchi to auscultation.  No accessory muscle use  CARDIOVASCULAR: S1 and S2, no edema and no JVD  ABDOMEN:  normal bowel sounds, non-distended and no

## 2019-08-07 LAB
BLOOD CULTURE, ROUTINE: NORMAL
CULTURE, BLOOD 2: NORMAL
CULTURE, RESPIRATORY: ABNORMAL
GLUCOSE BLD-MCNC: 170 MG/DL (ref 70–99)
GLUCOSE BLD-MCNC: 203 MG/DL (ref 70–99)
GLUCOSE BLD-MCNC: 273 MG/DL (ref 70–99)
GLUCOSE BLD-MCNC: 286 MG/DL (ref 70–99)
GLUCOSE BLD-MCNC: 332 MG/DL (ref 70–99)
GRAM STAIN RESULT: ABNORMAL
INR BLD: 3.12 (ref 0.86–1.14)
ORGANISM: ABNORMAL
ORGANISM: ABNORMAL
PERFORMED ON: ABNORMAL
PROTHROMBIN TIME: 35.6 SEC (ref 9.8–13)

## 2019-08-07 PROCEDURE — 94640 AIRWAY INHALATION TREATMENT: CPT

## 2019-08-07 PROCEDURE — 2060000000 HC ICU INTERMEDIATE R&B

## 2019-08-07 PROCEDURE — 6370000000 HC RX 637 (ALT 250 FOR IP): Performed by: INTERNAL MEDICINE

## 2019-08-07 PROCEDURE — 2580000003 HC RX 258: Performed by: INTERNAL MEDICINE

## 2019-08-07 PROCEDURE — 6360000002 HC RX W HCPCS: Performed by: INTERNAL MEDICINE

## 2019-08-07 PROCEDURE — 92526 ORAL FUNCTION THERAPY: CPT

## 2019-08-07 PROCEDURE — 94761 N-INVAS EAR/PLS OXIMETRY MLT: CPT

## 2019-08-07 PROCEDURE — 2700000000 HC OXYGEN THERAPY PER DAY

## 2019-08-07 PROCEDURE — 94669 MECHANICAL CHEST WALL OSCILL: CPT

## 2019-08-07 PROCEDURE — 36415 COLL VENOUS BLD VENIPUNCTURE: CPT

## 2019-08-07 PROCEDURE — 85610 PROTHROMBIN TIME: CPT

## 2019-08-07 PROCEDURE — 97535 SELF CARE MNGMENT TRAINING: CPT

## 2019-08-07 PROCEDURE — 99232 SBSQ HOSP IP/OBS MODERATE 35: CPT | Performed by: INTERNAL MEDICINE

## 2019-08-07 RX ADMIN — PANTOPRAZOLE SODIUM 40 MG: 40 TABLET, DELAYED RELEASE ORAL at 06:07

## 2019-08-07 RX ADMIN — GABAPENTIN 300 MG: 300 CAPSULE ORAL at 21:03

## 2019-08-07 RX ADMIN — INSULIN LISPRO 5 UNITS: 100 INJECTION, SOLUTION INTRAVENOUS; SUBCUTANEOUS at 21:03

## 2019-08-07 RX ADMIN — IPRATROPIUM BROMIDE AND ALBUTEROL SULFATE 1 AMPULE: .5; 3 SOLUTION RESPIRATORY (INHALATION) at 20:48

## 2019-08-07 RX ADMIN — INSULIN LISPRO 12 UNITS: 100 INJECTION, SOLUTION INTRAVENOUS; SUBCUTANEOUS at 17:32

## 2019-08-07 RX ADMIN — INSULIN LISPRO 9 UNITS: 100 INJECTION, SOLUTION INTRAVENOUS; SUBCUTANEOUS at 12:39

## 2019-08-07 RX ADMIN — PRAMIPEXOLE DIHYDROCHLORIDE 0.5 MG: 0.25 TABLET ORAL at 11:49

## 2019-08-07 RX ADMIN — CEFEPIME HYDROCHLORIDE 1 G: 1 INJECTION, POWDER, FOR SOLUTION INTRAMUSCULAR; INTRAVENOUS at 12:00

## 2019-08-07 RX ADMIN — BUPROPION HYDROCHLORIDE 100 MG: 100 TABLET, EXTENDED RELEASE ORAL at 11:48

## 2019-08-07 RX ADMIN — CARVEDILOL 3.12 MG: 3.12 TABLET, FILM COATED ORAL at 17:32

## 2019-08-07 RX ADMIN — BUMETANIDE 1 MG: 1 TABLET ORAL at 13:26

## 2019-08-07 RX ADMIN — SPIRONOLACTONE 25 MG: 25 TABLET ORAL at 11:48

## 2019-08-07 RX ADMIN — ALLOPURINOL 100 MG: 100 TABLET ORAL at 11:49

## 2019-08-07 RX ADMIN — IPRATROPIUM BROMIDE AND ALBUTEROL SULFATE 1 AMPULE: .5; 3 SOLUTION RESPIRATORY (INHALATION) at 16:17

## 2019-08-07 RX ADMIN — PREDNISONE 40 MG: 20 TABLET ORAL at 12:00

## 2019-08-07 RX ADMIN — GABAPENTIN 100 MG: 100 CAPSULE ORAL at 17:32

## 2019-08-07 RX ADMIN — LORAZEPAM 0.5 MG: 0.5 TABLET ORAL at 21:32

## 2019-08-07 RX ADMIN — Medication 10 ML: at 09:00

## 2019-08-07 RX ADMIN — IPRATROPIUM BROMIDE AND ALBUTEROL SULFATE 1 AMPULE: .5; 3 SOLUTION RESPIRATORY (INHALATION) at 07:37

## 2019-08-07 RX ADMIN — ASPIRIN 81 MG: 81 TABLET ORAL at 11:48

## 2019-08-07 RX ADMIN — DOXYCYCLINE HYCLATE 100 MG: 100 TABLET, COATED ORAL at 21:02

## 2019-08-07 RX ADMIN — Medication 10 ML: at 21:33

## 2019-08-07 RX ADMIN — GABAPENTIN 100 MG: 100 CAPSULE ORAL at 11:53

## 2019-08-07 RX ADMIN — IPRATROPIUM BROMIDE AND ALBUTEROL SULFATE 1 AMPULE: .5; 3 SOLUTION RESPIRATORY (INHALATION) at 11:45

## 2019-08-07 RX ADMIN — DOXYCYCLINE HYCLATE 100 MG: 100 TABLET, COATED ORAL at 12:00

## 2019-08-07 RX ADMIN — CARVEDILOL 3.12 MG: 3.12 TABLET, FILM COATED ORAL at 11:49

## 2019-08-07 RX ADMIN — GABAPENTIN 100 MG: 100 CAPSULE ORAL at 14:49

## 2019-08-07 RX ADMIN — LEVOTHYROXINE SODIUM 100 MCG: 100 TABLET ORAL at 06:07

## 2019-08-07 RX ADMIN — PRAMIPEXOLE DIHYDROCHLORIDE 0.5 MG: 0.25 TABLET ORAL at 21:33

## 2019-08-07 ASSESSMENT — PAIN SCALES - WONG BAKER
WONGBAKER_NUMERICALRESPONSE: 0

## 2019-08-07 ASSESSMENT — PAIN SCALES - GENERAL
PAINLEVEL_OUTOF10: 0
PAINLEVEL_OUTOF10: 0

## 2019-08-07 NOTE — PROGRESS NOTES
Speech Language Pathology  Dysphagia Treatment Note    Name:  Guanaco Lincoln  :   1930  Medical Diagnosis:  Multifocal pneumonia [J18.9]  Treatment Diagnosis: Oropharyngeal Dysphagia  Pain level: denies     Current Diet Level: 1) Dysphagia III Soft and Bite-Sized with Mildly Thick (Nectar) Liquids, meds with puree   2) If respiratory status declines recommend downgrade to Moderately Thick (honey) Liquids    Tolerance of Current Diet Level: Per chart review and, no documented difficulties with current diet level noted. MBS completed 2019: \"Results indicate moderate oropharyngeal dysphagia. Thin liquids revealed episodes of rapid premature bolus loss with deep laryngeal penetration during the swallow and aspiration after the swallow as well as episodes of aspiration during the swallow. Pt demonstrated suspected reduced sensation for timely reflexive cough in the presence of aspiration. Nectar thick liquids with large bolus revealed multiple swallows to clear with laryngeal penetration during the swallow. Reflexive throat clear appeared to clear penetration with nectar thick liquids. Overall dysphagia was characterized by impaired mastication, decreased A-P propulsion, premature bolus loss, deep pharyngeal pooling, delayed swallow initiation, reduced airway protection and impaired pharyngeal clearing. Improved mastication and clearing was viewed with soft solids vs regular solids. Thin and nectar thick liquids were noted to intermittently pool to the pyriforms prior to swallow initiation. Honey thick liquids pooled to the vallecular space prior to initiation. Mild pharyngeal stasis was assessed for all textures after pt utilized multiple swallows to clear. Overall pt demonstrates increased risk for aspiration/aspiration PNA due to deep pharyngeal pooling, impaired airway protection, impaired pharyngeal clearing and episodes of aspiration with absent/delayed reflexive cough. \"    Assessment of Texture Tolerance:  -Impressions: Pt was positioned upright in bed on O2 via nasal cannula. Son was present for session. Son and pt denied any difficulty with eating and drinking since diet change. Pt was seen at breakfast meal. Pt with congested cough prior to PO trials. Presented pt with nectar thick liquids and soft solids. Nectar thick liquids revealed prolonged holding of bolus in oral cavity, delayed swallow initiation, and decreased laryngeal elevation via manual palpation. Soft solids revealed mildly prolonged mastication, delayed swallow initiation, and decreased laryngeal elevation. Pt with intermittent coughing throughout evaluation, pt stated this happens with and without food or liquid. Overall pt appears to be tolerating current diet therefore continue diet recommendations unless s/s aspiration/penetration and/or respiratory changes are noted. Educated pt and pt's son re rationale for tx, diet recommendations, aspiration/penetration precautions, and pharyngeal exercises; verbalized understanding. Diet and Treatment Recommendations:  Continue current diet recommendations. ST)Pt will tolerate diet with no signs or symptoms of aspiration (ongoing 2019)     Plan:  Continued daily Dysphagia treatment with goals per plan of care. Patient/Family Education:Education given to the Pt and nurse, who verbalized understanding    Discharge Recommendations:  Pt will benefit from continued skilled Speech Therapy for Dysphagia services, prior to returning home. Recommend ongoing dysphagia tx with SLP upon d/c from hospital.     Timed Code Treatment:  0 minutes     Total Treatment Time:  10 minutes     Akila Calloway., [id number] [place], [place] [id number]  Speech-Language Pathologist    The speech-language pathologist was present, directed the patient's care, made skilled judgment and was responsible for assessment and treatment.     Scooby Cox    Clinician

## 2019-08-07 NOTE — PROGRESS NOTES
[facility] PROGRESS NOTE    8/7/2019 12:06 PM        Name: Uday Patel . Admitted: 8/2/2019  Primary Care Provider: Mike Berger (Tel: [phone])        Subjective: Does not have shortness of breath. Continues to need oxygen. .  Barium swallow was done  which shows aspiration to thin liquids.  Tolerates  Physical therapy takes oxygen at home        Reviewed interval ancillary notes    Current Medications    insulin glargine (LANTUS) injection pen 12 Units Nightly   cefepime (MAXIPIME) 1 g IVPB minibag Q24H   doxycycline hyclate (VIBRA-TABS) tablet 100 mg 2 times per day   predniSONE (DELTASONE) tablet 40 mg Daily   insulin lispro (HUMALOG) injection pen 0-18 Units TID WC   insulin lispro (HUMALOG) injection pen 0-9 Units Nightly   warfarin (COUMADIN) daily dosing (placeholder) RX Placeholder   ipratropium-albuterol (DUONEB) nebulizer solution 1 ampule Q4H WA   albuterol (PROVENTIL) nebulizer solution 2.5 mg Q4H PRN   benzonatate (TESSALON) capsule 100 mg TID PRN   acetaminophen (TYLENOL) tablet 1,000 mg Q6H PRN   ketorolac (TORADOL) injection 15 mg Q6H PRN   allopurinol (ZYLOPRIM) tablet 100 mg Daily   aspirin EC tablet 81 mg Daily   bumetanide (BUMEX) tablet 1 mg Daily   buPROPion (WELLBUTRIN SR) extended release tablet 100 mg Daily   carvedilol (COREG) tablet 3.125 mg BID WC   gabapentin (NEURONTIN) capsule 100 mg TID   levothyroxine (SYNTHROID) tablet 100 mcg Daily   LORazepam (ATIVAN) tablet 0.5 mg QPM   pantoprazole (PROTONIX) tablet 40 mg QAM AC   pramipexole (MIRAPEX) tablet 0.5 mg BID   spironolactone (ALDACTONE) tablet 25 mg Daily   sodium chloride flush 0.9 % injection 10 mL 2 times per day   sodium chloride flush 0.9 % injection 10 mL PRN   magnesium hydroxide (MILK OF MAGNESIA) 400 MG/5ML suspension 30 mL Daily PRN   ondansetron (ZOFRAN) injection 4 mg Q6H PRN   glucose (GLUTOSE) 40 % oral gel 15 g

## 2019-08-08 VITALS
OXYGEN SATURATION: 96 % | RESPIRATION RATE: 20 BRPM | SYSTOLIC BLOOD PRESSURE: 128 MMHG | TEMPERATURE: 97.8 F | WEIGHT: 138.4 LBS | DIASTOLIC BLOOD PRESSURE: 69 MMHG | HEIGHT: 64 IN | BODY MASS INDEX: 23.63 KG/M2 | HEART RATE: 88 BPM

## 2019-08-08 LAB
GLUCOSE BLD-MCNC: 186 MG/DL (ref 70–99)
GLUCOSE BLD-MCNC: 309 MG/DL (ref 70–99)
INR BLD: 1.98 (ref 0.86–1.14)
PERFORMED ON: ABNORMAL
PERFORMED ON: ABNORMAL
PROTHROMBIN TIME: 22.6 SEC (ref 9.8–13)

## 2019-08-08 PROCEDURE — 6370000000 HC RX 637 (ALT 250 FOR IP): Performed by: INTERNAL MEDICINE

## 2019-08-08 PROCEDURE — 94669 MECHANICAL CHEST WALL OSCILL: CPT

## 2019-08-08 PROCEDURE — 92526 ORAL FUNCTION THERAPY: CPT

## 2019-08-08 PROCEDURE — 2580000003 HC RX 258: Performed by: INTERNAL MEDICINE

## 2019-08-08 PROCEDURE — 99232 SBSQ HOSP IP/OBS MODERATE 35: CPT | Performed by: INTERNAL MEDICINE

## 2019-08-08 PROCEDURE — 85610 PROTHROMBIN TIME: CPT

## 2019-08-08 PROCEDURE — 36415 COLL VENOUS BLD VENIPUNCTURE: CPT

## 2019-08-08 PROCEDURE — 6360000002 HC RX W HCPCS: Performed by: INTERNAL MEDICINE

## 2019-08-08 PROCEDURE — 94761 N-INVAS EAR/PLS OXIMETRY MLT: CPT

## 2019-08-08 PROCEDURE — 94640 AIRWAY INHALATION TREATMENT: CPT

## 2019-08-08 PROCEDURE — 97530 THERAPEUTIC ACTIVITIES: CPT

## 2019-08-08 PROCEDURE — 2700000000 HC OXYGEN THERAPY PER DAY

## 2019-08-08 RX ORDER — WARFARIN SODIUM 2 MG/1
2 TABLET ORAL EVERY OTHER DAY
Status: DISCONTINUED | OUTPATIENT
Start: 2019-08-09 | End: 2019-08-08 | Stop reason: HOSPADM

## 2019-08-08 RX ORDER — DOXYCYCLINE HYCLATE 100 MG
100 TABLET ORAL 2 TIMES DAILY
Qty: 28 TABLET | Refills: 0 | Status: SHIPPED | OUTPATIENT
Start: 2019-08-08 | End: 2019-08-22

## 2019-08-08 RX ORDER — WARFARIN SODIUM 2 MG/1
2 TABLET ORAL EVERY OTHER DAY
Status: DISCONTINUED | OUTPATIENT
Start: 2019-08-08 | End: 2019-08-08

## 2019-08-08 RX ORDER — WARFARIN SODIUM 2 MG/1
4 TABLET ORAL EVERY OTHER DAY
Status: DISCONTINUED | OUTPATIENT
Start: 2019-08-08 | End: 2019-08-08 | Stop reason: HOSPADM

## 2019-08-08 RX ORDER — WARFARIN SODIUM 2 MG/1
4 TABLET ORAL EVERY OTHER DAY
Status: DISCONTINUED | OUTPATIENT
Start: 2019-08-09 | End: 2019-08-08

## 2019-08-08 RX ORDER — BENZONATATE 100 MG/1
100 CAPSULE ORAL 3 TIMES DAILY PRN
Qty: 15 CAPSULE | Refills: 0 | Status: SHIPPED | OUTPATIENT
Start: 2019-08-08 | End: 2019-08-15

## 2019-08-08 RX ADMIN — PREDNISONE 40 MG: 20 TABLET ORAL at 09:03

## 2019-08-08 RX ADMIN — IPRATROPIUM BROMIDE AND ALBUTEROL SULFATE 1 AMPULE: .5; 3 SOLUTION RESPIRATORY (INHALATION) at 07:10

## 2019-08-08 RX ADMIN — IPRATROPIUM BROMIDE AND ALBUTEROL SULFATE 1 AMPULE: .5; 3 SOLUTION RESPIRATORY (INHALATION) at 15:33

## 2019-08-08 RX ADMIN — PANTOPRAZOLE SODIUM 40 MG: 40 TABLET, DELAYED RELEASE ORAL at 05:41

## 2019-08-08 RX ADMIN — BUMETANIDE 1 MG: 1 TABLET ORAL at 09:17

## 2019-08-08 RX ADMIN — CARVEDILOL 3.12 MG: 3.12 TABLET, FILM COATED ORAL at 09:02

## 2019-08-08 RX ADMIN — BUPROPION HYDROCHLORIDE 100 MG: 100 TABLET, EXTENDED RELEASE ORAL at 09:03

## 2019-08-08 RX ADMIN — Medication 10 ML: at 09:06

## 2019-08-08 RX ADMIN — SPIRONOLACTONE 25 MG: 25 TABLET ORAL at 09:02

## 2019-08-08 RX ADMIN — ALLOPURINOL 100 MG: 100 TABLET ORAL at 09:03

## 2019-08-08 RX ADMIN — LEVOTHYROXINE SODIUM 100 MCG: 100 TABLET ORAL at 05:41

## 2019-08-08 RX ADMIN — ASPIRIN 81 MG: 81 TABLET ORAL at 09:02

## 2019-08-08 RX ADMIN — PRAMIPEXOLE DIHYDROCHLORIDE 0.5 MG: 0.25 TABLET ORAL at 09:02

## 2019-08-08 RX ADMIN — GABAPENTIN 100 MG: 100 CAPSULE ORAL at 13:31

## 2019-08-08 RX ADMIN — INSULIN LISPRO 3 UNITS: 100 INJECTION, SOLUTION INTRAVENOUS; SUBCUTANEOUS at 09:00

## 2019-08-08 RX ADMIN — IPRATROPIUM BROMIDE AND ALBUTEROL SULFATE 1 AMPULE: .5; 3 SOLUTION RESPIRATORY (INHALATION) at 11:08

## 2019-08-08 RX ADMIN — GABAPENTIN 100 MG: 100 CAPSULE ORAL at 09:02

## 2019-08-08 RX ADMIN — DOXYCYCLINE HYCLATE 100 MG: 100 TABLET, COATED ORAL at 09:01

## 2019-08-08 RX ADMIN — CEFEPIME HYDROCHLORIDE 1 G: 1 INJECTION, POWDER, FOR SOLUTION INTRAMUSCULAR; INTRAVENOUS at 09:01

## 2019-08-08 RX ADMIN — INSULIN LISPRO 12 UNITS: 100 INJECTION, SOLUTION INTRAVENOUS; SUBCUTANEOUS at 11:51

## 2019-08-08 ASSESSMENT — PAIN DESCRIPTION - PAIN TYPE: TYPE: NEUROPATHIC PAIN

## 2019-08-08 ASSESSMENT — PAIN DESCRIPTION - LOCATION: LOCATION: FOOT

## 2019-08-08 ASSESSMENT — PAIN SCALES - WONG BAKER
WONGBAKER_NUMERICALRESPONSE: 0

## 2019-08-08 ASSESSMENT — PAIN SCALES - GENERAL
PAINLEVEL_OUTOF10: 0
PAINLEVEL_OUTOF10: 0

## 2019-08-08 ASSESSMENT — PAIN DESCRIPTION - PROGRESSION: CLINICAL_PROGRESSION: GRADUALLY IMPROVING

## 2019-08-08 NOTE — DISCHARGE SUMMARY
bumetanide 1 MG tablet  Commonly known as:  BUMEX  Notes to patient:  Use: treat heart failure, fluid retention, lower blood pressure. Side effects: frequent urination, weakness, muscle cramps, increased sensitivity to light, nausea, and dizziness     buPROPion 100 MG tablet  Commonly known as:  [facility]  Notes to patient:  Use: to treat depression and/or promote smoking cessation  Side effects: dizziness, fatigue, stomach upset, increased heart rate, weight loss, dry mouth     carvedilol 3.125 MG tablet  Commonly known as:  COREG  Notes to patient:  Use: treat heart failure, prevent future heart attacks, lower blood pressure and heart rate, treat chest pain  Side effects: dizziness, fatigue, and diarrhea     * gabapentin 300 MG capsule  Commonly known as:  NEURONTIN  Notes to patient:  Use: Treat neuropathic pain  Side effects: Dizziness, fatigue, nausea, diarrhea      * gabapentin 100 MG capsule  Commonly known as:  NEURONTIN  Notes to patient:  Use: Treat neuropathic pain  Side effects: Dizziness, fatigue, nausea, diarrhea      insulin glargine 100 UNIT/ML injection vial  Commonly known as:  LANTUS  Notes to patient:  Use: treats diabetes or high blood sugar.  Long acting insulin  Side effects: Low blood sugar, irritation at the injection site     insulin lispro 100 UNIT/ML injection vial  Commonly known as:  HUMALOG  Notes to patient:  Use: High blood sugar, rapid acting  Side effects: low blood sugar, irritation at injection site     levothyroxine 100 MCG tablet  Commonly known as:  SYNTHROID  Notes to patient:  Use: to treat low thyroid levels  Side effects: hair loss, chest pain, irregular heartbeat, irritability, insomnia      LORazepam 0.5 MG tablet  Commonly known as:  ATIVAN  Notes to patient:  Use: eases anxiety and calms the nerves  Side effects: feeling lightheaded, sleepy, having blurred eyesight, confusion     montelukast 10 MG tablet  Commonly known as:  SINGULAIR  Notes to patient:  Use:

## 2019-08-08 NOTE — PROGRESS NOTES
Texture Tolerance:  -Impressions: Pt was positioned upright in chair on O2 via nasal cannula. Pt with intermittent dry cough noted at baseline. She was agreeable to trials of nectar thick liquids. Pt demonstrated oral holding with prolonged A-P propulsion, suspected pharyngeal pooling and delayed swallow initiation. Episodic dry cough continued to be noted. Reviewed safe swallow strategies, aspiration precautions, importance of oral care, recommendations for ongoing speech tx. Discussed adding additional sauce/gravy to meats. Recommend continuation of current diet level at this time. Diet and Treatment Recommendations:  Continue current diet recommendations. ST)Pt will tolerate diet with no signs or symptoms of aspiration (ongoing 2019)     Plan:  Continued daily Dysphagia treatment with goals per plan of care. Patient/Family Education:Education given to the Pt and nurse, who verbalized understanding    Discharge Recommendations:  Pt will benefit from continued skilled Speech Therapy for Dysphagia services, prior to returning home.  Recommend ongoing dysphagia tx with SLP upon d/c from hospital.     Timed Code Treatment:  0 minutes     Total Treatment Time:  10 minutes     David Adhikari, [facility] [address] [place] [address]  Speech Language Pathologist

## 2019-08-08 NOTE — PROGRESS NOTES
Home Oxygen Evaluation     Patients room air at rest saturation SpO2 92%      Patients room air saturation SpO2 84% with exertion       Patients SpO2 on exertion with oxygen       2 lpm = SpO2  97%

## 2019-08-08 NOTE — CARE COORDINATION
Met with patient to offer assistance with discharge, and he has no home care agency preference. Patient referred to [facility], pending a home care order. PCP list provided.

## 2019-08-08 NOTE — PROGRESS NOTES
[facility] Pulmonary and Critical Care  Progress note      Reason for Consult: Multifocal pneumonia, respiratory failure  Requesting Physician: Dr. Reveles Room  Subjective:   [facility]:                The patient is a 80 y.o. female with significant past medical history of:  History reviewed. No pertinent past medical history. Interval history: Dyspnea, currently on 2 L O2. Still has a congested cough and unable to bring up phlegm. No fevers noted. Past Surgical History:    History reviewed. No pertinent surgical history.   Current Medications:    Current Facility-Administered Medications: insulin glargine (LANTUS) injection pen 15 Units, 15 Units, Subcutaneous, Nightly  warfarin (COUMADIN) tablet 4 mg, 4 mg, Oral, Every Other Day  [START ON 8/9/2019] warfarin (COUMADIN) tablet 2 mg, 2 mg, Oral, Every Other Day  cefepime (MAXIPIME) 1 g IVPB minibag, 1 g, Intravenous, Q24H  doxycycline hyclate (VIBRA-TABS) tablet 100 mg, 100 mg, Oral, 2 times per day  insulin lispro (HUMALOG) injection pen 0-18 Units, 0-18 Units, Subcutaneous, TID WC  insulin lispro (HUMALOG) injection pen 0-9 Units, 0-9 Units, Subcutaneous, Nightly  ipratropium-albuterol (DUONEB) nebulizer solution 1 ampule, 1 ampule, Inhalation, Q4H WA  albuterol (PROVENTIL) nebulizer solution 2.5 mg, 2.5 mg, Nebulization, Q4H PRN  benzonatate (TESSALON) capsule 100 mg, 100 mg, Oral, TID PRN  acetaminophen (TYLENOL) tablet 1,000 mg, 1,000 mg, Oral, Q6H PRN  ketorolac (TORADOL) injection 15 mg, 15 mg, Intravenous, Q6H PRN  allopurinol (ZYLOPRIM) tablet 100 mg, 100 mg, Oral, Daily  aspirin EC tablet 81 mg, 81 mg, Oral, Daily  bumetanide (BUMEX) tablet 1 mg, 1 mg, Oral, Daily  buPROPion (WELLBUTRIN SR) extended release tablet 100 mg, 100 mg, Oral, Daily  carvedilol (COREG) tablet 3.125 mg, 3.125 mg, Oral, BID WC  gabapentin (NEURONTIN) capsule 100 mg, 100 mg, Oral, TID  levothyroxine (SYNTHROID) tablet 100 mcg, 100 mcg, Oral, Daily  LORazepam (ATIVAN)

## 2019-08-09 ENCOUNTER — TELEPHONE (OUTPATIENT)
Dept: PULMONOLOGY | Age: 84
End: 2019-08-09

## 2019-08-09 NOTE — PROGRESS NOTES
Occupational Therapy Discharge Summary    Name: Rita Estevez  : 1930    The pt was evaluated by OT on 19 and seen for 2 treatment sessions prior to DC home on 19, per MD order. The pt's acute therapy goals were:    Short term goals  Time Frame for Short term goals: Discharge  Short term goal 1: functional mobility mod I - goal not met   Short term goal 2: functional ADL transfer mod I - goal not met   Short term goal 3: UB/LB ADLs mod I - goal not met LB dressing , bathing and UB ADLs not addressed pt declined 87  Short term goal 4: grooming in stance at sink mod I - goal not met         Patient met 0 goals during stay. Number of Refusals:0  Number of Holds: 0    During this hospitalization, the patient was educated on:  Patient Education: safety awareness, ADL bathing/dressing    DC pt from OT caseload at this time. Thank you!     Lindy Mendez, OTR/L, [id number]

## 2019-08-09 NOTE — PROGRESS NOTES
8.9.19  [address] [place] [address] of discharge with home care. Home care orders faxed 8.9.19 to [facility].

## 2019-08-14 ENCOUNTER — OFFICE VISIT (OUTPATIENT)
Dept: PULMONOLOGY | Age: 84
End: 2019-08-14
Payer: COMMERCIAL

## 2019-08-14 VITALS — OXYGEN SATURATION: 95 % | DIASTOLIC BLOOD PRESSURE: 56 MMHG | HEART RATE: 56 BPM | SYSTOLIC BLOOD PRESSURE: 112 MMHG

## 2019-08-14 DIAGNOSIS — J18.9 MULTIFOCAL PNEUMONIA: ICD-10-CM

## 2019-08-14 DIAGNOSIS — J96.11 CHRONIC RESPIRATORY FAILURE WITH HYPOXIA (HCC): Primary | ICD-10-CM

## 2019-08-14 DIAGNOSIS — J44.9 COPD, SEVERITY TO BE DETERMINED (HCC): ICD-10-CM

## 2019-08-14 PROCEDURE — 99214 OFFICE O/P EST MOD 30 MIN: CPT | Performed by: NURSE PRACTITIONER

## 2019-08-14 RX ORDER — IPRATROPIUM BROMIDE AND ALBUTEROL SULFATE 2.5; .5 MG/3ML; MG/3ML
1 SOLUTION RESPIRATORY (INHALATION) EVERY 6 HOURS PRN
Qty: 360 ML | Refills: 3 | Status: SHIPPED | OUTPATIENT
Start: 2019-08-14 | End: 2019-11-12

## 2019-08-14 ASSESSMENT — ENCOUNTER SYMPTOMS
COUGH: 1
COLOR CHANGE: 0
ABDOMINAL PAIN: 0
CONSTIPATION: 0
SHORTNESS OF BREATH: 1

## 2019-08-14 NOTE — PROGRESS NOTES
mouth 2 times daily for 14 days 28 tablet 0    LORazepam (ATIVAN) 0.5 MG tablet Take 0.5 mg by mouth every evening.  montelukast (SINGULAIR) 10 MG tablet Take 10 mg by mouth nightly      traMADol (ULTRAM) 50 MG tablet Take 50 mg by mouth every 6 hours as needed for Pain.  allopurinol (ZYLOPRIM) 100 MG tablet Take 100 mg by mouth daily      levothyroxine (SYNTHROID) 100 MCG tablet Take 100 mcg by mouth Daily      omeprazole (PRILOSEC) 20 MG delayed release capsule Take 20 mg by mouth daily      buPROPion (WELLBUTRIN) 100 MG tablet Take 100 mg by mouth 2 times daily      bumetanide (BUMEX) 1 MG tablet Take 1 mg by mouth daily      gabapentin (NEURONTIN) 300 MG capsule Take 300 mg by mouth every evening.  warfarin (COUMADIN) 2 MG tablet Take 2 mg by mouth      carvedilol (COREG) 3.125 MG tablet Take 3.125 mg by mouth 2 times daily (with meals)      pramipexole (MIRAPEX) 0.5 MG tablet Take 0.5 mg by mouth 2 times daily       spironolactone (ALDACTONE) 25 MG tablet Take 25 mg by mouth daily      aspirin 81 MG tablet Take 81 mg by mouth daily      gabapentin (NEURONTIN) 100 MG capsule Take 100 mg by mouth 3 times daily.  ferrous sulfate (SLOW FE) 142 (45 Fe) MG extended release tablet Take 142 mg by mouth daily      insulin lispro (HUMALOG) 100 UNIT/ML injection vial Inject into the skin 3 times daily (before meals)      insulin glargine (LANTUS) 100 UNIT/ML injection vial Inject 10 Units into the skin nightly        No current facility-administered medications on file prior to visit. Review of Systems   Constitutional: Negative for chills and fever. HENT: Negative for congestion and postnasal drip. Respiratory: Positive for cough and shortness of breath. Cardiovascular: Negative for chest pain and leg swelling. Gastrointestinal: Negative for abdominal pain and constipation. Musculoskeletal: Negative for arthralgias and joint swelling.    Skin: Negative for color change and pallor. Allergic/Immunologic: Negative for environmental allergies and food allergies. Psychiatric/Behavioral: Negative for agitation and confusion. Objective:       VITALS:  BP (!) 112/56   Pulse 56   SpO2 95% Comment: 2 L     Physical Exam   Constitutional: She is oriented to person, place, and time. She appears well-developed and well-nourished. No distress. HENT:   Head: Normocephalic. Mouth/Throat: No oropharyngeal exudate. Eyes: Pupils are equal, round, and reactive to light. Conjunctivae are normal. Right eye exhibits no discharge. Left eye exhibits no discharge. Neck: Normal range of motion. Neck supple. No tracheal deviation present. Cardiovascular: Normal rate, regular rhythm and intact distal pulses. Exam reveals no friction rub. Pulmonary/Chest: Effort normal. No accessory muscle usage or stridor. No tachypnea. No respiratory distress. She has no wheezes. She has no rales. She exhibits no tenderness and no crepitus. Abdominal: Soft. Bowel sounds are normal. She exhibits no distension. There is no tenderness. Musculoskeletal: Normal range of motion. She exhibits no edema. Lymphadenopathy:     She has no cervical adenopathy. Neurological: She is alert and oriented to person, place, and time. Skin: Skin is warm and dry. No erythema. Psychiatric: She has a normal mood and affect. Thought content normal.   Vitals reviewed. DATA:      Radiology Review:  Pertinent images / reports were reviewed as a part of this visit. CT chest done 8/2/19 reveals the following:  Extensive multifocal pneumonia manifested by dense consolidation as well as discrete and confluent scattered airspace nodularity is an underlying ground-glass attenuation maximal in the lower lobes and relatively sparing left upper lobe and lung apices. There is reactive mediastinal lymphadenopathy. Last PFTs:  None on file. Assessment / Plan:   1.  Chronic respiratory failure with hypoxia

## 2019-08-19 ENCOUNTER — HOSPITAL ENCOUNTER (OUTPATIENT)
Age: 84
Discharge: HOME | End: 2019-08-19
Payer: MEDICARE

## 2019-08-19 ENCOUNTER — FOLLOWUP TELEPHONE ENCOUNTER (OUTPATIENT)
Dept: INPATIENT UNIT | Age: 84
End: 2019-08-19

## 2019-08-19 ENCOUNTER — HOSPITAL ENCOUNTER (EMERGENCY)
Age: 84
Discharge: HOME | End: 2019-08-19
Attending: EMERGENCY MEDICINE
Payer: MEDICARE

## 2019-08-19 ENCOUNTER — TELEPHONE (OUTPATIENT)
Dept: OTHER | Facility: CLINIC | Age: 84
End: 2019-08-19

## 2019-08-19 VITALS
DIASTOLIC BLOOD PRESSURE: 71 MMHG | RESPIRATION RATE: 16 BRPM | WEIGHT: 138.4 LBS | OXYGEN SATURATION: 100 % | HEART RATE: 97 BPM | TEMPERATURE: 98.8 F | BODY MASS INDEX: 23.76 KG/M2 | SYSTOLIC BLOOD PRESSURE: 114 MMHG

## 2019-08-19 DIAGNOSIS — R79.1 ELEVATED INR: Primary | ICD-10-CM

## 2019-08-19 LAB
A/G RATIO: 1.1 (ref 1.1–2.2)
ALBUMIN SERPL-MCNC: 3.3 G/DL (ref 3.4–5)
ALP BLD-CCNC: 186 U/L (ref 40–129)
ALT SERPL-CCNC: 14 U/L (ref 10–40)
ANION GAP SERPL CALCULATED.3IONS-SCNC: 13 MMOL/L (ref 3–16)
APTT: 58.9 SEC (ref 26–36)
AST SERPL-CCNC: 17 U/L (ref 15–37)
BASOPHILS ABSOLUTE: 0 K/UL (ref 0–0.2)
BASOPHILS RELATIVE PERCENT: 1.5 %
BILIRUB SERPL-MCNC: 0.4 MG/DL (ref 0–1)
BUN BLDV-MCNC: 28 MG/DL (ref 7–20)
CALCIUM SERPL-MCNC: 9.3 MG/DL (ref 8.3–10.6)
CHLORIDE BLD-SCNC: 102 MMOL/L (ref 99–110)
CO2: 25 MMOL/L (ref 21–32)
CREAT SERPL-MCNC: 1.4 MG/DL (ref 0.6–1.2)
EOSINOPHILS ABSOLUTE: 0.1 K/UL (ref 0–0.6)
EOSINOPHILS RELATIVE PERCENT: 3.8 %
GFR AFRICAN AMERICAN: 43
GFR NON-AFRICAN AMERICAN: 35
GLOBULIN: 3.1 G/DL
GLUCOSE BLD-MCNC: 307 MG/DL (ref 70–99)
HCT VFR BLD CALC: 31.2 % (ref 36–48)
HEMOGLOBIN: 9.7 G/DL (ref 12–16)
INR BLD: 6.53 (ref 0.86–1.14)
INR BLD: 6.61 (ref 0.86–1.14)
LYMPHOCYTES ABSOLUTE: 0.7 K/UL (ref 1–5.1)
LYMPHOCYTES RELATIVE PERCENT: 22.7 %
MCH RBC QN AUTO: 27.7 PG (ref 26–34)
MCHC RBC AUTO-ENTMCNC: 31.2 G/DL (ref 31–36)
MCV RBC AUTO: 88.9 FL (ref 80–100)
MONOCYTES ABSOLUTE: 0.3 K/UL (ref 0–1.3)
MONOCYTES RELATIVE PERCENT: 8.7 %
NEUTROPHILS ABSOLUTE: 2 K/UL (ref 1.7–7.7)
NEUTROPHILS RELATIVE PERCENT: 63.3 %
PDW BLD-RTO: 20.5 % (ref 12.4–15.4)
PLATELET # BLD: 117 K/UL (ref 135–450)
PMV BLD AUTO: 9.1 FL (ref 5–10.5)
POTASSIUM SERPL-SCNC: 4.4 MMOL/L (ref 3.5–5.1)
PROTHROMBIN TIME: 74.4 SEC (ref 9.8–13)
PROTHROMBIN TIME: 75.4 SEC (ref 9.8–13)
RBC # BLD: 3.51 M/UL (ref 4–5.2)
SODIUM BLD-SCNC: 140 MMOL/L (ref 136–145)
TOTAL PROTEIN: 6.4 G/DL (ref 6.4–8.2)
WBC # BLD: 3.2 K/UL (ref 4–11)

## 2019-08-19 PROCEDURE — 85610 PROTHROMBIN TIME: CPT

## 2019-08-19 PROCEDURE — 6370000000 HC RX 637 (ALT 250 FOR IP): Performed by: EMERGENCY MEDICINE

## 2019-08-19 PROCEDURE — 85730 THROMBOPLASTIN TIME PARTIAL: CPT

## 2019-08-19 PROCEDURE — 80053 COMPREHEN METABOLIC PANEL: CPT

## 2019-08-19 PROCEDURE — 36415 COLL VENOUS BLD VENIPUNCTURE: CPT

## 2019-08-19 PROCEDURE — 85025 COMPLETE CBC W/AUTO DIFF WBC: CPT

## 2019-08-19 PROCEDURE — 99283 EMERGENCY DEPT VISIT LOW MDM: CPT

## 2019-08-19 RX ORDER — PHYTONADIONE 5 MG/1
5 TABLET ORAL ONCE
Status: COMPLETED | OUTPATIENT
Start: 2019-08-19 | End: 2019-08-19

## 2019-08-19 RX ADMIN — PHYTONADIONE 5 MG: 5 TABLET ORAL at 21:50

## 2019-08-20 NOTE — ED PROVIDER NOTES
TABLET    Take 1 tablet by mouth 2 times daily for 14 days    FERROUS SULFATE (SLOW FE) 142 (45 FE) MG EXTENDED RELEASE TABLET    Take 142 mg by mouth daily    GABAPENTIN (NEURONTIN) 100 MG CAPSULE    Take 100 mg by mouth 3 times daily. GABAPENTIN (NEURONTIN) 300 MG CAPSULE    Take 300 mg by mouth every evening. INSULIN GLARGINE (LANTUS) 100 UNIT/ML INJECTION VIAL    Inject 10 Units into the skin nightly     INSULIN LISPRO (HUMALOG) 100 UNIT/ML INJECTION VIAL    Inject into the skin 3 times daily (before meals)    IPRATROPIUM-ALBUTEROL (DUONEB) 0.5-2.5 (3) MG/3ML SOLN NEBULIZER SOLUTION    Inhale 3 mLs into the lungs every 6 hours as needed for Shortness of Breath    LEVOTHYROXINE (SYNTHROID) 100 MCG TABLET    Take 100 mcg by mouth Daily    LORAZEPAM (ATIVAN) 0.5 MG TABLET    Take 0.5 mg by mouth every evening. MONTELUKAST (SINGULAIR) 10 MG TABLET    Take 10 mg by mouth nightly    OMEPRAZOLE (PRILOSEC) 20 MG DELAYED RELEASE CAPSULE    Take 20 mg by mouth daily    PRAMIPEXOLE (MIRAPEX) 0.5 MG TABLET    Take 0.5 mg by mouth 2 times daily     SPIRONOLACTONE (ALDACTONE) 25 MG TABLET    Take 25 mg by mouth daily    TRAMADOL (ULTRAM) 50 MG TABLET    Take 50 mg by mouth every 6 hours as needed for Pain. WARFARIN (COUMADIN) 2 MG TABLET    Take 2 mg by mouth       ALLERGIES     Latex; Ace inhibitors; Advair diskus [fluticasone-salmeterol]; Byetta 10 mcg pen [exenatide]; Ciprofloxacin; Codeine; Contrast [barium-containing compounds]; Demerol hcl [meperidine]; Glucophage [metformin hcl]; Levaquin [levofloxacin in d5w]; Morphine; Niacin and related; Penicillins; Rocephin [ceftriaxone]; Statins; and Zetia [ezetimibe]    FAMILY HISTORY     History reviewed. No pertinent family history. SOCIAL HISTORY       Social History     Socioeconomic History    Marital status:       Spouse name: None    Number of children: None    Years of education: None    Highest education level: None   Occupational History  None   Social Needs    Financial resource strain: None    Food insecurity:     Worry: None     Inability: None    Transportation needs:     Medical: None     Non-medical: None   Tobacco Use    Smoking status: Former Smoker     Packs/day: 1.00     Types: Cigarettes     Start date:      Last attempt to quit: 1985     Years since quittin.6    Smokeless tobacco: Never Used   Substance and Sexual Activity    Alcohol use: Never     Frequency: Never    Drug use: Never    Sexual activity: None   Lifestyle    Physical activity:     Days per week: None     Minutes per session: None    Stress: None   Relationships    Social connections:     Talks on phone: None     Gets together: None     Attends Gnosticist service: None     Active member of club or organization: None     Attends meetings of clubs or organizations: None     Relationship status: None    Intimate partner violence:     Fear of current or ex partner: None     Emotionally abused: None     Physically abused: None     Forced sexual activity: None   Other Topics Concern    None   Social History Narrative    None       SCREENINGS             PHYSICAL EXAM    (up to 7 for level 4, 8 or more for level 5)     ED Triage Vitals [19]   BP Temp Temp src Pulse Resp SpO2 Height Weight   114/71 98.8 °F (37.1 °C) -- 97 16 100 % -- 138 lb 6.4 oz (62.8 kg)       Physical Exam   Constitutional: She appears well-developed and well-nourished. HENT:   Head: Normocephalic and atraumatic. Eyes: Right eye exhibits no discharge. Left eye exhibits no discharge. No pallor. Cardiovascular: Normal rate. Pulmonary/Chest: Effort normal. No respiratory distress. Abdominal: Soft. There is no tenderness. Musculoskeletal:   Chronic changes of the joints of the hand likely from arthritis   Neurological: She is alert. Skin: Skin is warm. No pallor. Psychiatric: She has a normal mood and affect.  Her behavior is normal.   Nursing note and vitals reviewed.           DIAGNOSTIC RESULTS     EKG: All EKG's are interpreted by the Emergency Department Physician who either signs or Co-signs this chart in the absence of a cardiologist.    12 lead EKG shows     RADIOLOGY:   Non-plain film images such as CT, Ultrasound and MRI are read by the radiologist. Plain radiographic images are visualized and preliminarily interpreted by the emergency physician with the below findings:        Interpretation per the Radiologist below, if available at the time of this note:    No orders to display         ED BEDSIDE ULTRASOUND:   Performed by ED Physician - none    LABS:  Labs Reviewed   PROTIME-INR - Abnormal; Notable for the following components:       Result Value    Protime 74.4 (*)     INR 6.53 (*)     All other components within normal limits    Narrative:     Arlette Klein  SFER[facility] tel. [phone],  Coag results called to and read back by RN Juanita Frankel, 08/19/2019 20:27, by  Mary Jo Gong  Performed at:  [facility] [address], [place], [address]   Phone [phone]   APTT - Abnormal; Notable for the following components:    aPTT 58.9 (*)     All other components within normal limits    Narrative:     Performed at:  [facility] [address], [place], [address]   Phone [phone]   CBC WITH AUTO DIFFERENTIAL - Abnormal; Notable for the following components:    WBC 3.2 (*)     RBC 3.51 (*)     Hemoglobin 9.7 (*)     Hematocrit 31.2 (*)     RDW 20.5 (*)     Platelets 681 (*)     Lymphocytes # 0.7 (*)     All other components within normal limits    Narrative:     Performed at:  [facility] [address], [place], [address]   Phone [phone]   COMPREHENSIVE METABOLIC PANEL - Abnormal; Notable for the following components:    Glucose 307 (*)     BUN 28 (*)     CREATININE 1.4 (*)     GFR Non- 35 (*)     GFR  43 (*)     Alb 3.3 (*)

## 2019-08-27 ENCOUNTER — TELEPHONE (OUTPATIENT)
Dept: PULMONOLOGY | Age: 84
End: 2019-08-27

## 2019-08-28 ENCOUNTER — OFFICE VISIT (OUTPATIENT)
Dept: PULMONOLOGY | Age: 84
End: 2019-08-28
Payer: MEDICARE

## 2019-08-28 ENCOUNTER — HOSPITAL ENCOUNTER (OUTPATIENT)
Age: 84
Discharge: HOME | End: 2019-08-28
Payer: MEDICARE

## 2019-08-28 ENCOUNTER — HOSPITAL ENCOUNTER (OUTPATIENT)
Dept: GENERAL RADIOLOGY | Age: 84
Discharge: HOME | End: 2019-08-28
Payer: MEDICARE

## 2019-08-28 ENCOUNTER — HOSPITAL ENCOUNTER (OUTPATIENT)
Dept: GENERAL RADIOLOGY | Age: 84
End: 2019-08-28
Payer: MEDICARE

## 2019-08-28 VITALS
BODY MASS INDEX: 22.14 KG/M2 | WEIGHT: 129 LBS | DIASTOLIC BLOOD PRESSURE: 58 MMHG | SYSTOLIC BLOOD PRESSURE: 104 MMHG | RESPIRATION RATE: 16 BRPM

## 2019-08-28 DIAGNOSIS — J96.11 CHRONIC RESPIRATORY FAILURE WITH HYPOXIA (HCC): Primary | ICD-10-CM

## 2019-08-28 DIAGNOSIS — J18.9 MULTIFOCAL PNEUMONIA: ICD-10-CM

## 2019-08-28 DIAGNOSIS — J44.9 COPD, SEVERITY TO BE DETERMINED (HCC): ICD-10-CM

## 2019-08-28 PROCEDURE — G8420 CALC BMI NORM PARAMETERS: HCPCS | Performed by: NURSE PRACTITIONER

## 2019-08-28 PROCEDURE — 1090F PRES/ABSN URINE INCON ASSESS: CPT | Performed by: NURSE PRACTITIONER

## 2019-08-28 PROCEDURE — G8427 DOCREV CUR MEDS BY ELIG CLIN: HCPCS | Performed by: NURSE PRACTITIONER

## 2019-08-28 PROCEDURE — G8926 SPIRO NO PERF OR DOC: HCPCS | Performed by: NURSE PRACTITIONER

## 2019-08-28 PROCEDURE — 71046 X-RAY EXAM CHEST 2 VIEWS: CPT

## 2019-08-28 PROCEDURE — 4040F PNEUMOC VAC/ADMIN/RCVD: CPT | Performed by: NURSE PRACTITIONER

## 2019-08-28 PROCEDURE — 99214 OFFICE O/P EST MOD 30 MIN: CPT | Performed by: NURSE PRACTITIONER

## 2019-08-28 PROCEDURE — 1111F DSCHRG MED/CURRENT MED MERGE: CPT | Performed by: NURSE PRACTITIONER

## 2019-08-28 PROCEDURE — 1123F ACP DISCUSS/DSCN MKR DOCD: CPT | Performed by: NURSE PRACTITIONER

## 2019-08-28 PROCEDURE — 1036F TOBACCO NON-USER: CPT | Performed by: NURSE PRACTITIONER

## 2019-08-28 PROCEDURE — 3023F SPIROM DOC REV: CPT | Performed by: NURSE PRACTITIONER

## 2019-08-28 ASSESSMENT — ENCOUNTER SYMPTOMS
TROUBLE SWALLOWING: 1
COLOR CHANGE: 0
SHORTNESS OF BREATH: 1
ABDOMINAL PAIN: 0
COUGH: 1
CONSTIPATION: 0

## 2019-08-28 NOTE — PROGRESS NOTES
distress. HENT:   Head: Normocephalic. Mouth/Throat: No oropharyngeal exudate. Eyes: Pupils are equal, round, and reactive to light. Conjunctivae are normal. Right eye exhibits no discharge. Left eye exhibits no discharge. Neck: Normal range of motion. Neck supple. No tracheal deviation present. Cardiovascular: Normal rate, regular rhythm and intact distal pulses. Exam reveals no friction rub. Pulmonary/Chest: Effort normal. No accessory muscle usage or stridor. No tachypnea. No respiratory distress. She has no wheezes. She has no rales. She exhibits no tenderness and no crepitus. Abdominal: Soft. Bowel sounds are normal. She exhibits no distension. There is no tenderness. Musculoskeletal: Normal range of motion. She exhibits no edema. Lymphadenopathy:     She has no cervical adenopathy. Neurological: She is alert and oriented to person, place, and time. Skin: Skin is warm and dry. No erythema. Psychiatric: She has a normal mood and affect. Thought content normal.   Vitals reviewed. DATA:      Radiology Review:  Pertinent images / reports were reviewed as a part of this visit. CXR done 8/28/19:  Improved aeration of the lung bases. Patchy opacity remains in the right lower lobe     CT chest done 8/2/19 reveals the following:  Extensive multifocal pneumonia manifested by dense consolidation as well as discrete and confluent scattered airspace nodularity is an underlying ground-glass attenuation maximal in the lower lobes and relatively sparing left upper lobe and lung apices. There is reactive mediastinal lymphadenopathy. Last PFTs:  None on file. Assessment / Plan:   1.  Chronic respiratory failure with hypoxia (HCC)  - Oxygen saturations are acceptable on RA at rest  - Upon standing oxygen saturations drop to 84%, these improve to 91% with 2L NC O2  - She continues to benefit from 2L NC O2 with sleep and activity   - She would benefit from a POC as she remains relatively